# Patient Record
Sex: FEMALE | Race: BLACK OR AFRICAN AMERICAN | Employment: FULL TIME | URBAN - METROPOLITAN AREA
[De-identification: names, ages, dates, MRNs, and addresses within clinical notes are randomized per-mention and may not be internally consistent; named-entity substitution may affect disease eponyms.]

---

## 2017-03-23 ENCOUNTER — OFFICE VISIT (OUTPATIENT)
Dept: FAMILY MEDICINE CLINIC | Age: 32
End: 2017-03-23

## 2017-03-23 VITALS
HEART RATE: 73 BPM | OXYGEN SATURATION: 100 % | RESPIRATION RATE: 20 BRPM | TEMPERATURE: 98.9 F | BODY MASS INDEX: 39.09 KG/M2 | SYSTOLIC BLOOD PRESSURE: 137 MMHG | WEIGHT: 229 LBS | DIASTOLIC BLOOD PRESSURE: 89 MMHG | HEIGHT: 64 IN

## 2017-03-23 DIAGNOSIS — J40 BRONCHITIS: Primary | ICD-10-CM

## 2017-03-23 RX ORDER — PROMETHAZINE HYDROCHLORIDE AND CODEINE PHOSPHATE 6.25; 1 MG/5ML; MG/5ML
5 SOLUTION ORAL
Qty: 180 ML | Refills: 0 | Status: SHIPPED | OUTPATIENT
Start: 2017-03-23 | End: 2017-03-31 | Stop reason: SDUPTHER

## 2017-03-23 RX ORDER — HYDROCODONE BITARTRATE AND ACETAMINOPHEN 5; 325 MG/1; MG/1
TABLET ORAL
Refills: 0 | COMMUNITY
Start: 2017-01-25 | End: 2017-03-23 | Stop reason: CLARIF

## 2017-03-23 RX ORDER — AZITHROMYCIN 250 MG/1
TABLET, FILM COATED ORAL
Qty: 6 TAB | Refills: 0 | Status: SHIPPED | OUTPATIENT
Start: 2017-03-23 | End: 2017-03-28

## 2017-03-23 RX ORDER — CETIRIZINE HCL 10 MG
10 TABLET ORAL
COMMUNITY
Start: 2017-03-23 | End: 2017-05-22

## 2017-03-23 NOTE — LETTER
NOTIFICATION RETURN TO WORK 
 
3/23/2017 5:21 PM 
 
Ms. Haseeb Cook [address] [facility] [phone] To Whom It May Concern: 
 
Haseeb Cook is currently under the care of [facility]. She will return to work on: 2/27/17 If there are questions or concerns please have the patient contact our office. Sincerely, Yessy Stack NP

## 2017-03-23 NOTE — PATIENT INSTRUCTIONS
Bronchitis: Care Instructions  Your Care Instructions    Bronchitis is inflammation of the bronchial tubes, which carry air to the lungs. The tubes swell and produce mucus, or phlegm. The mucus and inflamed bronchial tubes make you cough. You may have trouble breathing. Most cases of bronchitis are caused by viruses like those that cause colds. Antibiotics usually do not help and they may be harmful. Bronchitis usually develops rapidly and lasts about 2 to 3 weeks in otherwise healthy people. Follow-up care is a key part of your treatment and safety. Be sure to make and go to all appointments, and call your doctor if you are having problems. It's also a good idea to know your test results and keep a list of the medicines you take. How can you care for yourself at home? · Take all medicines exactly as prescribed. Call your doctor if you think you are having a problem with your medicine. · Get some extra rest.  · Take an over-the-counter pain medicine, such as acetaminophen (Tylenol), ibuprofen (Advil, Motrin), or naproxen (Aleve) to reduce fever and relieve body aches. Read and follow all instructions on the label. · Do not take two or more pain medicines at the same time unless the doctor told you to. Many pain medicines have acetaminophen, which is Tylenol. Too much acetaminophen (Tylenol) can be harmful. · Take an over-the-counter cough medicine that contains dextromethorphan to help quiet a dry, hacking cough so that you can sleep. Avoid cough medicines that have more than one active ingredient. Read and follow all instructions on the label. · Breathe moist air from a humidifier, hot shower, or sink filled with hot water. The heat and moisture will thin mucus so you can cough it out. · Do not smoke. Smoking can make bronchitis worse. If you need help quitting, talk to your doctor about stop-smoking programs and medicines. These can increase your chances of quitting for good.   When should you call for help? Call 911 anytime you think you may need emergency care. For example, call if:  · You have severe trouble breathing. Call your doctor now or seek immediate medical care if:  · You have new or worse trouble breathing. · You cough up dark brown or bloody mucus (sputum). · You have a new or higher fever. · You have a new rash. Watch closely for changes in your health, and be sure to contact your doctor if:  · You cough more deeply or more often, especially if you notice more mucus or a change in the color of your mucus. · You are not getting better as expected. Where can you learn more? Go to http://yfn-juanjose.info/. Enter H333 in the search box to learn more about \"Bronchitis: Care Instructions. \"  Current as of: May 23, 2016  Content Version: 11.1  © 2420-8311 [employer]. Care instructions adapted under license by [employer] (which disclaims liability or warranty for this information). If you have questions about a medical condition or this instruction, always ask your healthcare professional. [address] any warranty or liability for your use of this information.

## 2017-03-23 NOTE — MR AVS SNAPSHOT
Visit Information Date & Time Provider Department Dept. Phone Encounter #  
 3/23/2017  4:30 PM Shen Beauchamp NP [facility] [phone] [id number] Your Appointments 4/10/2017  3:45 PM  
ROUTINE CARE with Jayme Mims MD  
[facility] OFFICE-[facility] ([address]) Appt Note: follow up [address] [place] [address] [phone] [phone] [address] [phone] Upcoming Health Maintenance Date Due  
 PAP AKA CERVICAL CYTOLOGY 5/6/2017* DTaP/Tdap/Td series (2 - Td) 3/23/2027 *Topic was postponed. The date shown is not the original due date. Allergies as of 3/23/2017  Review Complete On: 12/4/2016 By: Mookie Dickey RN No Known Allergies Current Immunizations  Never Reviewed No immunizations on file. Not reviewed this visit You Were Diagnosed With   
  
 Codes Comments Bronchitis    -  Primary ICD-10-CM: Q62 ICD-9-CM: 152 Vitals BP Pulse Temp Resp Height(growth percentile) Weight(growth percentile) 137/89 73 98.9 °F (37.2 °C) (Oral) 20 5' 4\" (1.626 m) 229 lb (103.9 kg) LMP SpO2 BMI OB Status Smoking Status 03/02/2017 100% 39.31 kg/m2 Having regular periods Never Smoker Vitals History BMI and BSA Data Body Mass Index Body Surface Area  
 39.31 kg/m 2 2.17 m 2 Preferred Pharmacy Pharmacy Name Phone [facility] [address] [phone] Your Updated Medication List  
  
   
This list is accurate as of: 3/23/17  5:22 PM.  Always use your most recent med list.  
  
  
  
  
 albuterol 90 mcg/actuation inhaler Commonly known as:  PROVENTIL HFA, VENTOLIN HFA, PROAIR HFA Take 2 Puffs by inhalation every six (6) hours as needed for Wheezing. azithromycin 250 mg tablet Commonly known as:  [place] Take 2 tablets today, then take 1 tablet daily  
  
 calcium-cholecalciferol (d3) 600-125 mg-unit Tab Take  by mouth.  
  
 cellulose (bulk) Cap Take  by mouth. cetirizine 10 mg tablet Commonly known as:  ZYRTEC Take 1 Tab by mouth nightly.  
  
 garlic 980 mg Cap capsule Take 500 mg by mouth daily. meloxicam 15 mg tablet Commonly known as:  MOBIC Take 1 Tab by mouth daily. With first meal of the day. multivitamin tablet Commonly known as:  ONE A DAY Take 1 Tab by mouth daily. OTHER  
  
 promethazine-codeine 6.25-10 mg/5 mL syrup Commonly known as:  PHENERGAN with CODEINE Take 5 mL by mouth every six (6) hours as needed for Cough. Max Daily Amount: 20 mL. Prescriptions Printed Refills  
 promethazine-codeine (PHENERGAN WITH CODEINE) 6.25-10 mg/5 mL syrup 0 Sig: Take 5 mL by mouth every six (6) hours as needed for Cough. Max Daily Amount: 20 mL. Class: Print Route: Oral  
  
Prescriptions Sent to Pharmacy Refills  
 azithromycin (ZITHROMAX) 250 mg tablet 0 Sig: Take 2 tablets today, then take 1 tablet daily Class: Normal  
 Pharmacy: [facility] [address] [place] [address] Ph #: [phone] Patient Instructions Bronchitis: Care Instructions Your Care Instructions Bronchitis is inflammation of the bronchial tubes, which carry air to the lungs. The tubes swell and produce mucus, or phlegm. The mucus and inflamed bronchial tubes make you cough. You may have trouble breathing. Most cases of bronchitis are caused by viruses like those that cause colds. Antibiotics usually do not help and they may be harmful. Bronchitis usually develops rapidly and lasts about 2 to 3 weeks in otherwise healthy people. Follow-up care is a key part of your treatment and safety.  Be sure to make and go to all appointments, and call your doctor if you are having problems. It's also a good idea to know your test results and keep a list of the medicines you take. How can you care for yourself at home? · Take all medicines exactly as prescribed. Call your doctor if you think you are having a problem with your medicine. · Get some extra rest. 
· Take an over-the-counter pain medicine, such as acetaminophen (Tylenol), ibuprofen (Advil, Motrin), or naproxen (Aleve) to reduce fever and relieve body aches. Read and follow all instructions on the label. · Do not take two or more pain medicines at the same time unless the doctor told you to. Many pain medicines have acetaminophen, which is Tylenol. Too much acetaminophen (Tylenol) can be harmful. · Take an over-the-counter cough medicine that contains dextromethorphan to help quiet a dry, hacking cough so that you can sleep. Avoid cough medicines that have more than one active ingredient. Read and follow all instructions on the label. · Breathe moist air from a humidifier, hot shower, or sink filled with hot water. The heat and moisture will thin mucus so you can cough it out. · Do not smoke. Smoking can make bronchitis worse. If you need help quitting, talk to your doctor about stop-smoking programs and medicines. These can increase your chances of quitting for good. When should you call for help? Call 911 anytime you think you may need emergency care. For example, call if: 
· You have severe trouble breathing. Call your doctor now or seek immediate medical care if: 
· You have new or worse trouble breathing. · You cough up dark brown or bloody mucus (sputum). · You have a new or higher fever. · You have a new rash. Watch closely for changes in your health, and be sure to contact your doctor if: 
· You cough more deeply or more often, especially if you notice more mucus or a change in the color of your mucus. · You are not getting better as expected. Where can you learn more? Go to http://yfn-juanjose.info/. Enter H333 in the search box to learn more about \"Bronchitis: Care Instructions. \" Current as of: May 23, 2016 Content Version: 11.1 © 7800-8610 [employer] Incorporated. Care instructions adapted under license by [employer] (which disclaims liability or warranty for this information). If you have questions about a medical condition or this instruction, always ask your healthcare professional. [address] any warranty or liability for your use of this information. Introducing [facility]! Wendy Villagran introduces PacketFront patient portal. Now you can access parts of your medical record, email your doctor's office, and request medication refills online. 1. In your internet browser, go to [email or web address] 2. Click on the First Time User? Click Here link in the Sign In box. You will see the New Member Sign Up page. 3. Enter your PacketFront Access Code exactly as it appears below. You will not need to use this code after youve completed the sign-up process. If you do not sign up before the expiration date, you must request a new code. · PacketFront Access Code: [email or web address] Expires: 6/21/2017  5:22 PM 
 
4. Enter the last four digits of your Social Security Number (xxxx) and Date of Birth (mm/dd/yyyy) as indicated and click Submit. You will be taken to the next sign-up page. 5. Create a PacketFront ID. This will be your PacketFront login ID and cannot be changed, so think of one that is secure and easy to remember. 6. Create a PacketFront password. You can change your password at any time. 7. Enter your Password Reset Question and Answer. This can be used at a later time if you forget your password. 8. Enter your e-mail address. You will receive e-mail notification when new information is available in [address] 9. Click Sign Up. You can now view and download portions of your medical record. 10. Click the Download Summary menu link to download a portable copy of your medical information. If you have questions, please visit the Frequently Asked Questions section of the [employer] website. Remember, [employer] is NOT to be used for urgent needs. For medical emergencies, dial 911. Now available from your iPhone and Android! Please provide this summary of care documentation to your next provider. Your primary care clinician is listed as Adriana Decker. If you have any questions after today's visit, please call [phone].

## 2017-03-23 NOTE — PROGRESS NOTES
HISTORY OF PRESENT ILLNESS  Blake Ventura is a 33 y.o. female. HPI  Patient comes in today for cough. 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 Hx asthma. Complains of mostly dry cough, worse at night. Will occasionally have thick yellow mucus. No fever, chills. Some wheezing, dyspnea, chest pains. Appetite good. Denies N/V. Some diarrhea. Body aches. Feels like in last week symptoms have worsened. No Known Allergies    Past Medical History:   Diagnosis Date    Anxiety     Asthma        Past Surgical History:   Procedure Laterality Date    HX WISDOM TEETH EXTRACTION         Social History     Social History    Marital status: SINGLE     Spouse name: N/A    Number of children: N/A    Years of education: N/A     Occupational History    Not on file. Social History Main Topics    Smoking status: Never Smoker    Smokeless tobacco: Never Used    Alcohol use 1.2 oz/week     2 Cans of beer per week    Drug use: Yes     Special: Marijuana    Sexual activity: Not Currently     Other Topics Concern    Not on file     Social History Narrative       Family History   Problem Relation Age of Onset    Diabetes Mother     Hypertension Mother     Elevated Lipids Mother     Hypertension Father     Elevated Lipids Father     Diabetes Father        Current Outpatient Prescriptions   Medication Sig    meloxicam (MOBIC) 15 mg tablet Take 1 Tab by mouth daily. With first meal of the day.  HYDROcodone-acetaminophen (NORCO) 7.5-325 mg per tablet Take 0.5-1 Tabs by mouth every six (6) hours as needed for Pain. Max Daily Amount: 4 Tabs.  calcium-cholecalciferol, d3, 600-125 mg-unit tab Take  by mouth.  cellulose, bulk, cap Take  by mouth.  OTHER     garlic 733 mg cap capsule Take 500 mg by mouth daily.  multivitamin (ONE A DAY) tablet Take 1 Tab by mouth daily.     albuterol (PROVENTIL HFA, VENTOLIN HFA, PROAIR HFA) 90 mcg/actuation inhaler Take 2 Puffs by inhalation every six (6) hours as needed for Wheezing. No current facility-administered medications for this visit. Review of Systems   Constitutional: Negative for chills, diaphoresis, fever, malaise/fatigue and weight loss. HENT: Negative for congestion, ear pain, sore throat and tinnitus. Respiratory: Positive for cough, sputum production, shortness of breath and wheezing. Cardiovascular: Positive for chest pain. Negative for palpitations. Gastrointestinal: Positive for diarrhea. Negative for abdominal pain, nausea and vomiting. Genitourinary: Negative for dysuria, frequency and urgency. Musculoskeletal: Negative for myalgias. Skin: Negative. Neurological: Negative for dizziness, tingling, sensory change, speech change, focal weakness and headaches. Endo/Heme/Allergies: Positive for environmental allergies. Psychiatric/Behavioral: The patient has insomnia (related to coughing at night). Vitals:    03/23/17 1644   BP: 137/89   Pulse: 73   Resp: 20   Temp: 98.9 °F (37.2 °C)   TempSrc: Oral   SpO2: 100%   Weight: 229 lb (103.9 kg)   Height: 5' 4\" (1.626 m)     Physical Exam   Constitutional: She is oriented to person, place, and time. Vital signs are normal. She appears well-developed and well-nourished. She is cooperative. HENT:   Right Ear: Hearing, tympanic membrane, external ear and ear canal normal.   Left Ear: Hearing, tympanic membrane, external ear and ear canal normal.   Nose: Mucosal edema present. Right sinus exhibits no maxillary sinus tenderness and no frontal sinus tenderness. Left sinus exhibits no maxillary sinus tenderness and no frontal sinus tenderness. Mouth/Throat: Uvula is midline, oropharynx is clear and moist and mucous membranes are normal. Mucous membranes are not pale and not dry. No oropharyngeal exudate, posterior oropharyngeal edema or posterior oropharyngeal erythema. Neck: No thyroid mass and no thyromegaly present. Cardiovascular: Normal rate, regular rhythm, S1 normal, S2 normal and normal heart sounds. No murmur heard. Pulses:       Radial pulses are 2+ on the right side, and 2+ on the left side. Dorsalis pedis pulses are 2+ on the right side, and 2+ on the left side. Posterior tibial pulses are 2+ on the right side, and 2+ on the left side. Pulmonary/Chest: Effort normal. She has no decreased breath sounds. She has no wheezes. She has rhonchi. She has no rales. Bronchospastic cough   Lymphadenopathy:        Head (right side): No submental, no submandibular, no tonsillar, no preauricular and no posterior auricular adenopathy present. Head (left side): No submental, no submandibular, no tonsillar, no preauricular and no posterior auricular adenopathy present. She has no cervical adenopathy. Right: No supraclavicular adenopathy present. Left: No supraclavicular adenopathy present. Neurological: She is alert and oriented to person, place, and time. Skin: Skin is warm, dry and intact. Psychiatric: She has a normal mood and affect. Her speech is normal and behavior is normal. Thought content normal.   Vitals reviewed. ASSESSMENT and PLAN    ICD-10-CM ICD-9-CM    1. Bronchitis J40 490 azithromycin (ZITHROMAX) 250 mg tablet      promethazine-codeine (PHENERGAN WITH CODEINE) 6.25-10 mg/5 mL syrup      cetirizine (ZYRTEC) 10 mg tablet     Encounter Diagnoses   Name Primary?  Bronchitis Yes     Orders Placed This Encounter    DISCONTD: HYDROcodone-acetaminophen (NORCO) 5-325 mg per tablet    azithromycin (ZITHROMAX) 250 mg tablet    promethazine-codeine (PHENERGAN WITH CODEINE) 6.25-10 mg/5 mL syrup    cetirizine (ZYRTEC) 10 mg tablet     Keara was seen today for cough, headache and wheezing. Diagnoses and all orders for this visit:    Bronchitis - abx, cough medication. Patient to start taking OTC antihistamine, such as claritin or zyrtec.   Continue to use albuterol prn.  If no improvement in 3-4 days patient to call office for another appointment or advice. -     azithromycin (ZITHROMAX) 250 mg tablet; Take 2 tablets today, then take 1 tablet daily  -     promethazine-codeine (PHENERGAN WITH CODEINE) 6.25-10 mg/5 mL syrup; Take 5 mL by mouth every six (6) hours as needed for Cough. Max Daily Amount: 20 mL. Follow-up Disposition: Not on File    I have reviewed the patient's allergies and made any necessary changes. Medical, procedural, social and family histories have been reviewed and updated as medically indicated. I have reconciled and/or revised patient medications in the EMR. I have discussed each diagnosis listed in this note with Lauryn Mabry and/or their family. I have discussed treatment options and the risk/benefit analysis of those options, including safe use of medications and possible medication side effects. Through the use of shared decision making we have agreed to the above plan. The patient has received an after-visit summary and questions were answered concerning future plans. Margarita Colón, COY-JOAQUIN    This note will not be viewable in [employer].

## 2017-03-31 DIAGNOSIS — J40 BRONCHITIS: ICD-10-CM

## 2017-03-31 RX ORDER — PROMETHAZINE HYDROCHLORIDE AND CODEINE PHOSPHATE 6.25; 1 MG/5ML; MG/5ML
5 SOLUTION ORAL
Qty: 180 ML | Refills: 0 | Status: SHIPPED | OUTPATIENT
Start: 2017-03-31 | End: 2017-05-22

## 2017-04-10 ENCOUNTER — OFFICE VISIT (OUTPATIENT)
Dept: FAMILY MEDICINE CLINIC | Age: 32
End: 2017-04-10

## 2017-04-10 VITALS
BODY MASS INDEX: 38.58 KG/M2 | WEIGHT: 226 LBS | TEMPERATURE: 99 F | HEART RATE: 70 BPM | RESPIRATION RATE: 16 BRPM | DIASTOLIC BLOOD PRESSURE: 74 MMHG | HEIGHT: 64 IN | SYSTOLIC BLOOD PRESSURE: 119 MMHG | OXYGEN SATURATION: 100 %

## 2017-04-10 DIAGNOSIS — E66.9 OBESITY, UNSPECIFIED OBESITY SEVERITY, UNSPECIFIED OBESITY TYPE: ICD-10-CM

## 2017-04-10 DIAGNOSIS — D50.9 IRON DEFICIENCY ANEMIA, UNSPECIFIED IRON DEFICIENCY ANEMIA TYPE: Primary | ICD-10-CM

## 2017-04-10 DIAGNOSIS — M17.0 OSTEOARTHRITIS OF BOTH KNEES, UNSPECIFIED OSTEOARTHRITIS TYPE: ICD-10-CM

## 2017-04-10 LAB — HBA1C MFR BLD HPLC: 5.5 %

## 2017-04-10 RX ORDER — AZITHROMYCIN 250 MG/1
TABLET, FILM COATED ORAL
COMMUNITY
Start: 2017-03-23 | End: 2017-05-22 | Stop reason: ALTCHOICE

## 2017-04-10 NOTE — PROGRESS NOTES
HISTORY OF PRESENT ILLNESS  Heidi Pollack is a 33 y.o. female here today for follow up of anemia. She's a little down today, she is disappointed in her weight. 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 She is still doing Herbal Life but not consistently. She has h/o TED Hb stays around 9, she was using Herbal Life and supplements instead of iron pills before but now that she is not as consistent with Herbal Life she will start OTC iron supplement. Anemia   The history is provided by the patient. This is a chronic problem. The current episode started more than 1 week ago. Pertinent negatives include no chest pain, no abdominal pain and no shortness of breath. Review of Systems   Respiratory: Negative for shortness of breath. Cardiovascular: Negative for chest pain. Gastrointestinal: Negative for abdominal pain. Physical Exam   Constitutional: She is oriented to person, place, and time. She appears well-developed and well-nourished. /74 (BP 1 Location: Left arm, BP Patient Position: Sitting)  Pulse 70  Temp 99 °F (37.2 °C) (Oral)   Resp 16  Ht 5' 4\" (1.626 m)  Wt 226 lb (102.5 kg)  LMP 03/29/2017  SpO2 100%  BMI 38.79 kg/m2     HENT:   Head: Normocephalic and atraumatic. Eyes: No scleral icterus. Neck: No thyromegaly present. Cardiovascular: Normal heart sounds. Pulmonary/Chest: Breath sounds normal.   Abdominal: Soft. There is no tenderness. Musculoskeletal: She exhibits no edema. Lymphadenopathy:     She has no cervical adenopathy. Neurological: She is alert and oriented to person, place, and time. Psychiatric:   tearful   Nursing note and vitals reviewed.     Patient Active Problem List   Diagnosis Code    Exercise-induced asthma J45.990    Obesity E66.9    Osteoarthritis of both knees M17.0     Past Medical History:   Diagnosis Date    Anxiety     Asthma      Social History Social History    Marital status: SINGLE     Spouse name: N/A    Number of children: N/A    Years of education: N/A     Social History Main Topics    Smoking status: Never Smoker    Smokeless tobacco: Never Used    Alcohol use 1.2 oz/week     2 Cans of beer per week    Drug use: Yes     Special: Marijuana    Sexual activity: Not Currently     Other Topics Concern    None     Social History Narrative     Family History   Problem Relation Age of Onset    Diabetes Mother     Hypertension Mother     Elevated Lipids Mother     Hypertension Father     Elevated Lipids Father     Diabetes Father      Current Outpatient Prescriptions   Medication Sig    azithromycin (ZITHROMAX) 250 mg tablet     OTHER     promethazine-codeine (PHENERGAN WITH CODEINE) 6.25-10 mg/5 mL syrup Take 5 mL by mouth every six (6) hours as needed for Cough. Max Daily Amount: 20 mL.  meloxicam (MOBIC) 15 mg tablet Take 1 Tab by mouth daily. With first meal of the day.  calcium-cholecalciferol, d3, 600-125 mg-unit tab Take  by mouth.  cellulose, bulk, cap Take  by mouth.  OTHER     garlic 353 mg cap capsule Take 500 mg by mouth daily.  multivitamin (ONE A DAY) tablet Take 1 Tab by mouth daily.  albuterol (PROVENTIL HFA, VENTOLIN HFA, PROAIR HFA) 90 mcg/actuation inhaler Take 2 Puffs by inhalation every six (6) hours as needed for Wheezing.  cetirizine (ZYRTEC) 10 mg tablet Take 1 Tab by mouth nightly. No Known Allergies    ASSESSMENT and PLAN  Labs pending, pt to start OTC iron pill daily, agreeable to nutritional referral. Care plan reviewed and pt understands. After visit summary printed and reviewed with patient. Milka El was seen today for anemia.     Diagnoses and all orders for this visit:    Iron deficiency anemia, unspecified iron deficiency anemia type  -     REFERRAL TO NUTRITION  -     CBC WITH AUTOMATED DIFF  -     METABOLIC PANEL, COMPREHENSIVE    Osteoarthritis of both knees, unspecified osteoarthritis type    Obesity, unspecified obesity severity, unspecified obesity type  -     REFERRAL TO NUTRITION  -     Cancel: HEMOGLOBIN A1C WITH EAG  -     AMB POC HEMOGLOBIN A1C      lab results and schedule of future lab studies reviewed with patient

## 2017-04-11 LAB
ALBUMIN SERPL-MCNC: 3.9 G/DL (ref 3.5–5.5)
ALBUMIN/GLOB SERPL: 1.4 {RATIO} (ref 1.2–2.2)
ALP SERPL-CCNC: 53 IU/L (ref 39–117)
ALT SERPL-CCNC: 14 IU/L (ref 0–32)
AST SERPL-CCNC: 19 IU/L (ref 0–40)
BASOPHILS # BLD AUTO: 0.1 X10E3/UL (ref 0–0.2)
BASOPHILS NFR BLD AUTO: 1 %
BILIRUB SERPL-MCNC: <0.2 MG/DL (ref 0–1.2)
BUN SERPL-MCNC: 10 MG/DL (ref 6–20)
BUN/CREAT SERPL: 13 (ref 9–23)
CALCIUM SERPL-MCNC: 8.8 MG/DL (ref 8.7–10.2)
CHLORIDE SERPL-SCNC: 102 MMOL/L (ref 96–106)
CO2 SERPL-SCNC: 24 MMOL/L (ref 18–29)
CREAT SERPL-MCNC: 0.8 MG/DL (ref 0.57–1)
EOSINOPHIL # BLD AUTO: 0.2 X10E3/UL (ref 0–0.4)
EOSINOPHIL NFR BLD AUTO: 3 %
ERYTHROCYTE [DISTWIDTH] IN BLOOD BY AUTOMATED COUNT: 17.1 % (ref 12.3–15.4)
GLOBULIN SER CALC-MCNC: 2.8 G/DL (ref 1.5–4.5)
GLUCOSE SERPL-MCNC: 104 MG/DL (ref 65–99)
HCT VFR BLD AUTO: 29.6 % (ref 34–46.6)
HGB BLD-MCNC: 9.1 G/DL (ref 11.1–15.9)
IMM GRANULOCYTES # BLD: 0 X10E3/UL (ref 0–0.1)
IMM GRANULOCYTES NFR BLD: 0 %
LYMPHOCYTES # BLD AUTO: 2.9 X10E3/UL (ref 0.7–3.1)
LYMPHOCYTES NFR BLD AUTO: 32 %
MCH RBC QN AUTO: 22.1 PG (ref 26.6–33)
MCHC RBC AUTO-ENTMCNC: 30.7 G/DL (ref 31.5–35.7)
MCV RBC AUTO: 72 FL (ref 79–97)
MONOCYTES # BLD AUTO: 0.6 X10E3/UL (ref 0.1–0.9)
MONOCYTES NFR BLD AUTO: 6 %
NEUTROPHILS # BLD AUTO: 5.4 X10E3/UL (ref 1.4–7)
NEUTROPHILS NFR BLD AUTO: 58 %
PLATELET # BLD AUTO: 300 X10E3/UL (ref 150–379)
POTASSIUM SERPL-SCNC: 4.4 MMOL/L (ref 3.5–5.2)
PROT SERPL-MCNC: 6.7 G/DL (ref 6–8.5)
RBC # BLD AUTO: 4.11 X10E6/UL (ref 3.77–5.28)
SODIUM SERPL-SCNC: 142 MMOL/L (ref 134–144)
WBC # BLD AUTO: 9.2 X10E3/UL (ref 3.4–10.8)

## 2017-04-11 NOTE — PROGRESS NOTES
Call pt, still anemic. Start OTC iron pill daily w stool softener, follow up for recheck 6 weeks, we will recheck labs at that time.

## 2017-04-21 ENCOUNTER — HOSPITAL ENCOUNTER (OUTPATIENT)
Dept: NUTRITION | Age: 32
Discharge: HOME | End: 2017-04-21
Payer: COMMERCIAL

## 2017-04-21 PROCEDURE — 97802 MEDICAL NUTRITION INDIV IN: CPT | Performed by: DIETITIAN, REGISTERED

## 2017-04-21 NOTE — PROGRESS NOTES
[address] [place] [address], [facility] [place] [facility], [facility] Timothy Grover 55   [phone]   Nutrition Assessment - Medical Nutrition Therapy   Outpatient  Initial Evaluation         Patient Name: Debbie Morrison : 1985   Treatment Diagnosis: Obesity, Iron Deficiency Anemia Onset Date:    Referral Source: Clau Barajas MD Start of Care [facility]): 2017     Ht: 64 in  cm Wt:  221.2 lb  kg   BMI: 45  BMR male    BMR female 1010     Diagnosis: See above. Medications of Nutritional Significance:   Herbalife fiber supplements and multivitamin tab     Subjective/Assessment: Pt is a 28yo female who works as an  for [employer]. She is here today for help with weight loss and Iron deficiency anemia. She is not currently taking iron supplement and states she would prefer food sources of iron. Labs show she has had anemia for at least a year and Ferritin has been as low as 6ng/ml (2016). Encouraged pt to take a ferrous form of iron in addition to incorporating the high iron foods discussed today. She has had weight loss success in the past using Herbalife and exercising. However she struggles with stress eating and has a hx of depression and anxiety. She has recently returned to exercise with a goal of 5 days per week at the gym. She is currently using Herbalife shakes to replace 2 meals and has snacks and 1 full meal.  She states in the past when she gained weight she was eating junk food in addition to her Herbalife shakes, but is now working to cut these junk foods out again. Provided positive reinforcement for motivation and goals. Educated pt on building a balanced plate, and set goals to remove junk foods from diet. Identified healthy snacks and worked together to [employer] for eating 2 full meals and only using Herbalife shake for 1 meal replacement each day. Pt expressed some concern over cost of foods so also provided [employer] list to help pick out healthy low cost foods for meal plan options. Pt expressed comprehension, high motivation, and compliance is expected. Current Eating Patterns: 2 Herbalife shakes per day, Herbalife teas and fiber supplements. 1 meal, some snacks in between (fruit, nuts, carrots with ranch, etc)  When stressed at work or sad pt will turn to junk foods including large Cookout shakes with extra cookies, chips, french fries, sodas, etc.  She is currently working to remove these from her life. 1 meal = salad with dressing, crab salad, tuna salad, and pasta salad       Handouts/  Information Provided: []  Carbohydrates  []  Protein  []  Fiber  [x]  Serving Sizes  [x]  Meal and Snack Ideas  []  Food Journals []  Diabetes  []  Cholesterol  []  Sodium  []  Gen Nutr Guidelines  []  SBGM Guidelines  [x]  Others: Iron Food Sources; [employer]cery List   Estimate Needs:   Calories: 1700 Protein: 106 Carbs: 191 Fat: 57   Kcal/day  g/day  g/day  g/day         percent: 25 percent: 45 percent: 30     Nutritional Goal - To promote lifestyle changes to result in:    [x]  weight loss  []  Improved diabetic control  []  Decreased cholesterol levels  []  Decreased blood pressure  []  weight maintenance []  Preventing any interactions associated with food allergies  []  Adequate weight gain toward goal weight  [x]  Other:  Treat Anemia         Recommendations: -Exercise: 5 days per week.  Have at least one off day  -junk food: cut out chips, gummy bears, fries, milk-shakes, regular sodas, etc.   -choose better snacks: nuts (1/4 cup), carrots, pickles, mini-protein bar, 1 piece fruit  -3 meals per day with 1-2 snack options using balanced plate (limit to 1 fist of starch at meals) and include a good source of iron from list at each meal     Information Reviewed with: pt   Potential Barriers to Learning: Emotional eating patterns, anxiety/depression Dietitian Signature: Ham Parisi MS,RD Date: 4/21/2017   Follow-up:  Time: 2:13 PM

## 2017-05-05 ENCOUNTER — APPOINTMENT (OUTPATIENT)
Dept: NUTRITION | Age: 32
End: 2017-05-05
Payer: COMMERCIAL

## 2017-05-05 ENCOUNTER — HOSPITAL ENCOUNTER (OUTPATIENT)
Dept: NUTRITION | Age: 32
Discharge: HOME | End: 2017-05-05
Payer: COMMERCIAL

## 2017-05-05 PROCEDURE — 97803 MED NUTRITION INDIV SUBSEQ: CPT | Performed by: DIETITIAN, REGISTERED

## 2017-05-05 NOTE — PROGRESS NOTES
NUTRITION - DAILY TREATMENT NOTE  Patient Name: Isidra Gonzalez         Date: 2017  : 1985    YES Patient  Verified  Insurance: Payor: Tanya Durant / Plan: Kylie Small HMO / Product Type: HMO /    Diagnosis: In time:   11:00pm             Out time:   11:30pm   Total Treatment Time (min):   30     SUBJECTIVE    Medication Changes/New allergies or changes in medical history, any new surgeries or procedures? NO    If yes, update Summary List   Subjective Functional Status/Changes:  []  No changes reported     Pt has trying to exercise regularly and eat 3 meals with 1-2 snacks. One meal is an Herbalife shake per day. She has not been able to workout as consistently as planned and has had some larger meals when eating out with the team.  She is aware of the places she can do better including meal prepping ahead of time and expressed desire to improve her food choices. She has been able to avoid high calorie/sugar foods that she would have bought in the past more often. Reviewed goals and set up back-up plan for eating out to choose healthier options such as no chips, salad with lean protein at [employer]. Pt expressed comprehension, high motivation, and compliance is expected.        Current Wt: 220.8 Previous Wt: 221.2 Wt Change: -0.4     OBJECTIVE    Patient Education:  [x]  Review current plan with patient   []  Other:    Handouts/  Information Provided: []  Carbohydrates  []  Protein  []  Fiber  []  Serving Sizes  []  Fluids  []  General guidelines []  Diabetes  []  Cholesterol  []  Sodium  []  SBGM  []  Food Journals  []  Others:    Estimated Needs: [phone]    Calories Protein CHO Fat     ASSESSMENT    []  Pt Progressing Well  [x]  Slow Progress []  No Progress    Other:    []  Understand Dietary Changes/Recs []  No Change [x]  Improving []  N/A   []  Weight []  No Change [x]  Improving []  N/A     Glucose Levels []  No Change []  Improving []  N/A   []  Cholesterol Levels []  No Change []  Improving []  N/A     RECOMMENDATIONS    1. Exercise 5 days per week   2. email meal prep plan    3. Plan for eating out: avoid chips, order veggies, lean protein, 1 cup starch limit   4.    5.       PLAN    [x]  Continue on current plan []  Follow-up PRN   []  Discharge due to :    [x]  Next Appt[de-identified] May 15 @ 1:30pm     Dietitian: Wander Le MS, RD    Date: 5/5/2017 Time: 1:14 PM

## 2017-05-15 ENCOUNTER — HOSPITAL ENCOUNTER (OUTPATIENT)
Dept: NUTRITION | Age: 32
Discharge: HOME | End: 2017-05-15
Payer: COMMERCIAL

## 2017-05-15 PROCEDURE — 97803 MED NUTRITION INDIV SUBSEQ: CPT | Performed by: DIETITIAN, REGISTERED

## 2017-05-15 NOTE — PROGRESS NOTES
NUTRITION - DAILY TREATMENT NOTE  Patient Name: Valdene Fleischer         Date: 5/15/2017  : 1985    YES Patient  Verified  Insurance: Payor: Tabitha Ramos / Plan: Christianne Kathleen HMO / Product Type: HMO /    Diagnosis: In time:   1:30pm             Out time:   2:00pm   Total Treatment Time (min):   30     SUBJECTIVE    Medication Changes/New allergies or changes in medical history, any new surgeries or procedures? NO    If yes, update Summary List   Subjective Functional Status/Changes:  []  No changes reported     Pt has not started iron pill yet but will purchase today. She has done well with meal prep for lunch/dinner and using herbalife only 1 time per day. She is exercising 5 days per week with 30min total light cardio (walking/bike). She is proud that she has not had any chips in the past 2 weeks and has been able to avoid temptation for junk foods when stressed. Worked with pt to plan balanced snacks (complex carbs + protein) for days when full meals are far apart. Pt has been tracking intake on phone scott and shows average of 1100-1300kcal per day. Encouraged her to aim for 1300-1500kcal per day with activity for energy and healthy weight loss. despite lack of weight loss today on scale, pt notes her clothes are fitting better. Pt expressed comprehension, high motivation, and compliance is expected.       Current Wt: 223.2 Previous Wt: 220.8 Wt Change: +2.4     OBJECTIVE    Patient Education:  [x]  Review current plan with patient   []  Other:    Handouts/  Information Provided: []  Carbohydrates  []  Protein  []  Fiber  []  Serving Sizes  []  Fluids  []  General guidelines []  Diabetes  []  Cholesterol  []  Sodium  []  SBGM  []  Food Journals  []  Others:    Estimated Needs: [phone]    Calories Protein CHO Fat     ASSESSMENT    []  Pt Progressing Well  [x]  Slow Progress []  No Progress    Other:    []  Understand Dietary Changes/Recs []  No Change [x]  Improving []  N/A   []  Weight [x]  No Change []  Improving []  N/A     Glucose Levels []  No Change []  Improving []  N/A   []  Cholesterol Levels []  No Change []  Improving []  N/A     RECOMMENDATIONS    1. Change breakfast to 1 oatmeal packet + 2 eggs or 4-6 egg whites   2. Pack 1-2 snacks for the day between meals: each should have complex carb + protein (list provided)   3. Keep tracking on phone scott. Aim for 1300-1500kcal per day. 4.    5.       PLAN    [x]  Continue on current plan []  Follow-up PRN   []  Discharge due to :    [x]  Next Appt[de-identified] June 19 @ 11:30am (Antonio)     Dietitian: Desirae Alcaraz MS, RD    Date: 5/15/2017 Time: 4:58 PM

## 2017-05-22 ENCOUNTER — OFFICE VISIT (OUTPATIENT)
Dept: FAMILY MEDICINE CLINIC | Age: 32
End: 2017-05-22

## 2017-05-22 VITALS
DIASTOLIC BLOOD PRESSURE: 73 MMHG | SYSTOLIC BLOOD PRESSURE: 111 MMHG | OXYGEN SATURATION: 97 % | BODY MASS INDEX: 37.42 KG/M2 | RESPIRATION RATE: 16 BRPM | HEIGHT: 64 IN | HEART RATE: 65 BPM | WEIGHT: 219.2 LBS | TEMPERATURE: 99.2 F

## 2017-05-22 DIAGNOSIS — S89.92XA BLUNT TRAUMA OF LEFT LOWER LEG, INITIAL ENCOUNTER: Primary | ICD-10-CM
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
## 2017-05-22 NOTE — MR AVS SNAPSHOT
Visit Information Date & Time Provider Department Dept. Phone Encounter #  
 5/22/2017 12:30 PM Alonzo Gamble NP [facility] [phone] [id number] Follow-up Instructions Return if symptoms worsen or fail to improve. Upcoming Health Maintenance Date Due  
 PAP AKA CERVICAL CYTOLOGY 6/28/2004 INFLUENZA AGE 9 TO ADULT 8/1/2017 DTaP/Tdap/Td series (2 - Td) 3/23/2027 Allergies as of 5/22/2017  Review Complete On: 5/22/2017 By: Alonzo Gamble NP No Known Allergies Current Immunizations  Never Reviewed No immunizations on file. Not reviewed this visit You Were Diagnosed With   
  
 Codes Comments Blunt trauma of left lower leg, initial encounter    -  Primary ICD-10-CM: S89. 92XA ICD-9-CM: 914. 7 Vitals BP Pulse Temp Resp Height(growth percentile) Weight(growth percentile) 111/73 (BP 1 Location: Left arm, BP Patient Position: Sitting) 65 99.2 °F (37.3 °C) (Oral) 16 5' 4\" (1.626 m) 219 lb 3.2 oz (99.4 kg) LMP SpO2 BMI OB Status Smoking Status 04/28/2017 (Approximate) 97% 37.63 kg/m2 Having regular periods Never Smoker BMI and BSA Data Body Mass Index Body Surface Area  
 37.63 kg/m 2 2.12 m 2 Preferred Pharmacy Pharmacy Name Phone [facility] [address] [place] [address] [phone] Your Updated Medication List  
  
   
This list is accurate as of: 5/22/17  1:00 PM.  Always use your most recent med list.  
  
  
  
  
 albuterol 90 mcg/actuation inhaler Commonly known as:  PROVENTIL HFA, VENTOLIN HFA, PROAIR HFA Take 2 Puffs by inhalation every six (6) hours as needed for Wheezing. cellulose (bulk) Cap Take  by mouth.  
  
 garlic 118 mg Cap capsule Take 500 mg by mouth daily. meloxicam 15 mg tablet Commonly known as:  MOBIC Take 1 Tab by mouth daily. With first meal of the day. multivitamin tablet Commonly known as:  ONE A DAY Take 1 Tab by mouth daily. * OTHER  
  
 * OTHER  
  
 * Notice: This list has 2 medication(s) that are the same as other medications prescribed for you. Read the directions carefully, and ask your doctor or other care provider to review them with you. Follow-up Instructions Return if symptoms worsen or fail to improve. To-Do List   
 05/22/2017 Imaging:  XR TIB/FIB LT   
  
 06/19/2017 11:30 AM  
  Appointment with Yolanda Manzo at [address] [facility] ([phone]) Introducing [facility] SERVICES! [facility] introduces [employer] patient portal. Now you can access parts of your medical record, email your doctor's office, and request medication refills online. 1. In your internet browser, go to [email or web address] 2. Click on the First Time User? Click Here link in the Sign In box. You will see the New Member Sign Up page. 3. Enter your Postachio Access Code exactly as it appears below. You will not need to use this code after youve completed the sign-up process. If you do not sign up before the expiration date, you must request a new code. · Postachio Access Code: [email or web address] Expires: 6/21/2017  5:22 PM 
 
4. Enter the last four digits of your Social Security Number (xxxx) and Date of Birth (mm/dd/yyyy) as indicated and click Submit. You will be taken to the next sign-up page. 5. Create a Postachio ID. This will be your Postachio login ID and cannot be changed, so think of one that is secure and easy to remember. 6. Create a [employer]io password. You can change your password at any time. 7. Enter your Password Reset Question and Answer. This can be used at a later time if you forget your password. 8. Enter your e-mail address. You will receive e-mail notification when new information is available in [address] 9. Click Sign Up. You can now view and download portions of your medical record. 10. Click the Download Summary menu link to download a portable copy of your medical information. If you have questions, please visit the Frequently Asked Questions section of the [employer] website. Remember, [facility] is NOT to be used for urgent needs. For medical emergencies, dial 911. Now available from your iPhone and Android! Please provide this summary of care documentation to your next provider. Your primary care clinician is listed as Mariam Perrin. If you have any questions after today's visit, please call [phone].

## 2017-06-19 ENCOUNTER — HOSPITAL ENCOUNTER (OUTPATIENT)
Dept: NUTRITION | Age: 32
Discharge: HOME | End: 2017-06-19
Payer: COMMERCIAL

## 2017-06-19 PROCEDURE — 97803 MED NUTRITION INDIV SUBSEQ: CPT | Performed by: DIETITIAN, REGISTERED

## 2017-06-19 NOTE — PROGRESS NOTES
NUTRITION  DAILY TREATMENT NOTE  Patient Name: Randene Ahumada         Date: 2017  : 1985    YES Patient  Verified  Insurance: Payor: Gill Fitting / Plan: [employer] HMO / Product Type: HMO /    Diagnosis: In time:   11:30am             Out time:   12:00pm   Total Treatment Time (min):   30     SUBJECTIVE    Medication Changes/New allergies or changes in medical history, any new surgeries or procedures? NO    If yes, update Summary List   Subjective Functional Status/Changes:  []  No changes reported     Pt is taking her iron supplement everyday now. 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  She has done well with self-control to avoid high temptation foods such as milk shakes and when he does eat less healthy options she is doing so in controlled portion sizes. Reviewed diet hx. Encouraged her to have protein with each meal such as adding a greek yogurt to her oatmeal for breakfast (1 packet instead of 2), continue with her planned meal-prep for lunch (chicken, vegetables, and 1/2 cup brown rice), and only use her meal replacement Herbalife shake for 1 meal per day (she prefers dinner) or have a salad with protein. She has been working out 4 days per week (30min weights + 1.5 miles cardio walking as able with knees). Work is out for the summer next week. She plans to increased exercise and check out [facility] for water aerobics. Provided positive reinforcement for changes made. She has not been tracking food this month on scott due to being overwhelmed at work but plans to return now to help her stay on track and avoid temptation. Pt expressed comprehension, high motivation, and compliance is expected.         Current Wt: 220.0 Previous Wt: 223.2 Wt Change: -3.2     OBJECTIVE    Patient Education:  [x]  Review current plan with patient   []  Other: Handouts/  Information Provided: []  Carbohydrates  []  Protein  []  Fiber  []  Serving Sizes  []  Fluids  []  General guidelines []  Diabetes  []  Cholesterol  []  Sodium  []  SBGM  []  Food Journals  []  Others:    Estimated Needs: [phone] 83    Calories Protein CHO Fat     ASSESSMENT    []  Pt Progressing Well  [x]  Slow Progress []  No Progress    Other:    []  Understand Dietary Changes/Recs []  No Change [x]  Improving []  N/A   []  Weight []  No Change [x]  Improving []  N/A     Glucose Levels []  No Change []  Improving []  N/A   []  Cholesterol Levels []  No Change []  Improving []  N/A     RECOMMENDATIONS    1. Get back to tracking food on scott   2. Check out [employer] for increased cardio   3. Add greek yogurt to breakfast of 1 oatmeal packet; keep lunch same; shake or salad for dinner. 4. Continue taking iron each day. 5.       PLAN    [x]  Continue on current plan []  Follow-up PRN   []  Discharge due to :    [x]  Next Appt[de-identified] July 21 @ 9:00am [facility])     Dietitian: Nick Ridley MS, RD    Date: 6/19/2017 Time: 12:09 PM

## 2017-07-19 NOTE — PERIOP NOTES
[facility]  Ambulatory Surgery Unit  Pre-operative Instructions    Surgery/Procedure Date  7/25            Tentative Arrival Time 845am      1. On the day of your surgery/procedure, please report to the Ambulatory Surgery Unit Registration Desk and sign in at your designated time. The Ambulatory Surgery Unit is located in [facility] on the [facility] side of the [facility] across from the [address] [place] [address]. Please have all of your health insurance cards and a photo ID. 2. You must have someone with you to drive you home, as you should not drive a car for 24 hours following anesthesia. Please make arrangements for a responsible adult friend or family member to stay with you for at least the first 24 hours after your surgery. 3. Do not have anything to eat or drink (including water, gum, mints, coffee, juice) after midnight 7/24. This may not apply to medications prescribed by your physician. (Please note below the special instructions with medications to take the morning of surgery, if applicable.)    4. We recommend you do not drink any alcoholic beverages for 24 hours before and after your surgery. 5. Stop all Aspirin, non-steroidal anti-inflammatory drugs (i.e. Advil, Aleve), vitamins, and supplements as directed by your surgeon's office. **If you are currently taking Plavix, Coumadin, or other blood-thinning agents, contact your surgeon for instructions. **    6. In an effort to help prevent surgical site infection, we ask that you shower with an anti-bacterial soap (i.e. Dial or Safeguard) for 3 days prior to and on the morning of surgery, using a fresh towel after each shower. (Please begin this process with fresh bed linens.) Do not apply any lotions, powders, or deodorants after the shower on the day of your procedure. If applicable, please do not shave the operative site for 48 hours prior to surgery. 7. Wear comfortable clothes. Wear glasses instead of contacts.  Do not bring any jewelry or money (other than copays or fees as instructed). Do not wear make-up, particularly mascara, the morning of your surgery. Do not wear nail polish, particularly if you are having foot /hand surgery. Wear your hair loose or down, no ponytails, buns, sofiya pins or clips. All body piercings must be removed. 8. You should understand that if you do not follow these instructions your surgery may be cancelled. If your physical condition changes (i.e. fever, cold or flu) please contact your surgeon as soon as possible. 9. It is important that you be on time. If a situation occurs where you may be late, or if you have any questions or problems, please call [phone].    10. Your surgery time may be subject to change. You will receive a phone call the day prior to surgery to confirm your arrival time. Special Instructions: Take all medications and inhalers, as prescribed, on the morning of surgery with a sip of water EXCEPT: none      I understand a pre-operative phone call will be made to verify my surgery time. In the event that I am not available, I give permission for a message to be left on my answering service and/or with another person?       yes    Reviewed by phone with pt, verbalized understanding     ___________________      ___________________      ________________  (Signature of Patient)          (Witness)                   (Date and Time)

## 2017-07-21 ENCOUNTER — APPOINTMENT (OUTPATIENT)
Dept: NUTRITION | Age: 32
End: 2017-07-21
Payer: COMMERCIAL

## 2017-07-24 ENCOUNTER — ANESTHESIA EVENT (OUTPATIENT)
Dept: SURGERY | Age: 32
End: 2017-07-24
Payer: COMMERCIAL

## 2017-07-24 NOTE — H&P
Vital Signs   33Years Old Female  Height:  64.5 inches  Weight: 220.5 pounds  BMI:      37.40  BP:       120/78    Past Pregnancy History   : 0  Para:     0  Aborta:  0  Term: 0, Premature: 0, Living Children: 0, Vaginal Deliveries: 0, C-Sections: 0, Elect. Ab: 0, Spont. Ab: 0, Ectopics: 0    Gynecologic History   Last Menstrual Period: 2017  Does patient have any problems with urine leakage? no  Does patient have any other bladder problems? no  History of abnormal pap: no  Gardasil Injection History: Not Applicable  Pt currently sexually active: no  Pt ever sexually active: yes      Visit Type:  Problem GYN  Primary Provider:  Hannah Gabriel MD    CC:   ultrasound follow up for menorrhagia. Liliana Barreraer History of Present Illness:  33year old presents for hysteroscopy and Myosure polypectomy vs myomectomy and D&C  normal and regular menses every month with heaviness. day three and four are heaviest. menstrualflow lasts 7 days.   hgb=9.6-started iron  She states her periods have always been heavy with clots and she assumed this was normal  Ultrasound- 2 small subserous fibroids- 2.3 and 0.7 cm, 19 mm stripe with 1.7 cm echogenic mass in uterus, 1.6 cm CL cyst on right ovary, normal left ovary, no free fluid  Desires fertility  Allergies    This patient has no known allergies.     Medications Removed from Medication List    DICLOFENAC SODIUM TBEC (DICLOFENAC SODIUM TBEC) non vwc md          Past Medical History:     Reviewed history from 2015 and no changes required:        Rheumatoid Arthritis    Past Surgical History:     Reviewed history from 2015 and no changes required:        G[place]neur Teeth    Family History Summary:      Reviewed history Last on 2017 and no changes required:2017  Mother Carashon Saeed.) - Has Family History of Anaesthetic Complications - Entered On: 2015  Mother Cara Saeed.) - Has Family History of Diabetes - Entered On: 2015  Mother Cara Saeed.) - Has Family History of Hypertension - Entered On: 2015  Father Placido Sam.) - Has Family History of Diabetes - Entered On: 2015  MGM - Has Family History of Other Cancer - stomach - Entered On: 2015  MGF - Has Family History of Diabetes - Entered On: 2015  MGF - Has Family History of Lung Cancer - Entered On: 2015  PGM - Has Family History of Hypertension - Entered On: 2015  PGF - Has Family History of Heart Disease - MI - Entered On: 2015  Mother (Mari Young) - Has Family History of Other Cancer - thyroid cancer - Entered On: 2017      Social History:     Reviewed history from 2015 and no changes required:        Single        Works for [employer], truency officer      Risk Factors:     Smoked Tobacco Use:  Never smoker  Smokeless Tobacco Use:  Former  Alcohol use:  yes  Exercise:  yes     Times per week:  2  Seatbelt use:  100 %  Sun Exposure:  occasionally      Past Pregnancy History      Term Births:  0     Premature Births: 0     Living Children: 0     Prev : 0     Elect. Ab:  0     Spont. Ab:  0     Ectopics:  0      Review of Systems        See HPI    Except as noted in the HPI, the review of systems is negative for General, Breast, , Resp, GI, Endo, MS, Psych and Heme. General Medical Physical Exam:     Gastrointestinal:        Abdomen:  soft and non-tender; no masses    Genitourinary:        Ext. genitalia: normal appearance; no lesions or discharge       Urethra:  no discharge       Bladder:  no cystocele       Vagina:  normal appearing without lesions or discharge       Cervix:  normal appearance; no lesions or discharge       Uterus:  normal size and position; no masses       Adnexa:  no masses or tenderness            Impression & Recommendations:    Problem # 1:  Thickened endometrium (ICD-793.5) (CSG16-V33. 8)  Discussed that based on her ultrasound she may have an intracavitary polyp or fibroid. Ultrasound images reviewed with her.  We discussed how this could cause heavy periods and may affect ability to get pregnancy or increase risk of miscarriage. We discussed options of repeating Ultrasound after her next period vs proceeding with hysteroscopy, D&C and possible polypectomy or myomectomy with Myosure for definitive diagnosis and treatment. Risks of surgery reviewed including bleeding, infection, damage to surrounding organs including uterus, bowel, bladder, ureters, nerves, vessels and anesthetic risks. Possible need for further surgery discussed. Risk of post-op pain reviewed. Post-op care and recovery discussed. She would like to go ahead and set up surgery. Orders:  Detailed Moderate Est level 4 ([id number]-32146)      Problem # 2:  Menorrhagia (ICD-626.2) (PLB79-G02.0)    Orders:  Detailed Moderate Est level 4 ([id number])      Problem # 3:  Anemia (non screening) (ICD-285.9) (BVZ75-P39.9)  continue iron  Orders:  Detailed Moderate Est level 4 ([id number])      Problem # 4:  Cyst corpus luteum ruptured RIGHT (ICD-620.8) (RKO03-P49.8)  benign appearance, asymptomatic    Medications (at conclusion of this visit)    06/22/2017 * MEDICINE FOR ARTHRITIS Prescribed by non 606/706 Josué Chao MD          Date of Surgery Update:  Janina Alberto was seen and examined. History and physical has been reviewed. The patient has been examined.  There have been no significant clinical changes since the completion of the originally dated History and Physical.    Signed By: Bryant Lomeli MD     July 25, 2017 8:05 AM

## 2017-07-25 ENCOUNTER — ANESTHESIA (OUTPATIENT)
Dept: SURGERY | Age: 32
End: 2017-07-25
Payer: COMMERCIAL

## 2017-07-25 ENCOUNTER — HOSPITAL ENCOUNTER (OUTPATIENT)
Age: 32
Setting detail: OUTPATIENT SURGERY
Discharge: HOME | End: 2017-07-25
Attending: OBSTETRICS & GYNECOLOGY | Admitting: OBSTETRICS & GYNECOLOGY
Payer: COMMERCIAL

## 2017-07-25 VITALS
RESPIRATION RATE: 13 BRPM | TEMPERATURE: 98 F | SYSTOLIC BLOOD PRESSURE: 118 MMHG | OXYGEN SATURATION: 100 % | BODY MASS INDEX: 37.15 KG/M2 | HEART RATE: 66 BPM | HEIGHT: 64 IN | WEIGHT: 217.6 LBS | DIASTOLIC BLOOD PRESSURE: 85 MMHG

## 2017-07-25 PROBLEM — R93.89 THICKENED ENDOMETRIUM: Status: ACTIVE | Noted: 2017-07-25

## 2017-07-25 PROBLEM — N92.0 MENORRHAGIA: Status: ACTIVE | Noted: 2017-07-25

## 2017-07-25 LAB — HCG UR QL: NEGATIVE

## 2017-07-25 PROCEDURE — 77030018836 HC SOL IRR NACL ICUM -A: Performed by: OBSTETRICS & GYNECOLOGY

## 2017-07-25 PROCEDURE — 77030020255 HC SOL INJ LR 1000ML BG: Performed by: OBSTETRICS & GYNECOLOGY

## 2017-07-25 PROCEDURE — 76210000046 HC AMBSU PH II REC FIRST 0.5 HR: Performed by: OBSTETRICS & GYNECOLOGY

## 2017-07-25 PROCEDURE — 74011250636 HC RX REV CODE- 250/636

## 2017-07-25 PROCEDURE — 77030033650 HC DEV TISS RMVL MYOSUR HOLO -F: Performed by: OBSTETRICS & GYNECOLOGY

## 2017-07-25 PROCEDURE — 76210000040 HC AMBSU PH I REC FIRST 0.5 HR: Performed by: OBSTETRICS & GYNECOLOGY

## 2017-07-25 PROCEDURE — 76060000061 HC AMB SURG ANES 0.5 TO 1 HR: Performed by: OBSTETRICS & GYNECOLOGY

## 2017-07-25 PROCEDURE — 77030012961 HC IRR KT CYSTO/TUR ICUM -A: Performed by: OBSTETRICS & GYNECOLOGY

## 2017-07-25 PROCEDURE — 88305 TISSUE EXAM BY PATHOLOGIST: CPT | Performed by: OBSTETRICS & GYNECOLOGY

## 2017-07-25 PROCEDURE — 74011250636 HC RX REV CODE- 250/636: Performed by: ANESTHESIOLOGY

## 2017-07-25 PROCEDURE — 77030013079 HC BLNKT BAIR HGGR 3M -A: Performed by: NURSE ANESTHETIST, CERTIFIED REGISTERED

## 2017-07-25 PROCEDURE — 81025 URINE PREGNANCY TEST: CPT

## 2017-07-25 PROCEDURE — 74011000250 HC RX REV CODE- 250: Performed by: OBSTETRICS & GYNECOLOGY

## 2017-07-25 PROCEDURE — 76030000000 HC AMB SURG OR TIME 0.5 TO 1: Performed by: OBSTETRICS & GYNECOLOGY

## 2017-07-25 RX ORDER — KETOROLAC TROMETHAMINE 30 MG/ML
INJECTION, SOLUTION INTRAMUSCULAR; INTRAVENOUS AS NEEDED
Status: DISCONTINUED | OUTPATIENT
Start: 2017-07-25 | End: 2017-07-25 | Stop reason: HOSPADM

## 2017-07-25 RX ORDER — LIDOCAINE HYDROCHLORIDE 10 MG/ML
INJECTION, SOLUTION EPIDURAL; INFILTRATION; INTRACAUDAL; PERINEURAL AS NEEDED
Status: DISCONTINUED | OUTPATIENT
Start: 2017-07-25 | End: 2017-07-25 | Stop reason: HOSPADM

## 2017-07-25 RX ORDER — DIPHENHYDRAMINE HYDROCHLORIDE 50 MG/ML
12.5 INJECTION, SOLUTION INTRAMUSCULAR; INTRAVENOUS
Status: DISCONTINUED | OUTPATIENT
Start: 2017-07-25 | End: 2017-07-25 | Stop reason: HOSPADM

## 2017-07-25 RX ORDER — SILVER NITRATE 38.21; 12.74 MG/1; MG/1
STICK TOPICAL AS NEEDED
Status: DISCONTINUED | OUTPATIENT
Start: 2017-07-25 | End: 2017-07-25 | Stop reason: HOSPADM

## 2017-07-25 RX ORDER — LIDOCAINE HYDROCHLORIDE 10 MG/ML
0.1 INJECTION, SOLUTION EPIDURAL; INFILTRATION; INTRACAUDAL; PERINEURAL AS NEEDED
Status: DISCONTINUED | OUTPATIENT
Start: 2017-07-25 | End: 2017-07-25 | Stop reason: HOSPADM

## 2017-07-25 RX ORDER — MORPHINE SULFATE 10 MG/ML
2 INJECTION, SOLUTION INTRAMUSCULAR; INTRAVENOUS
Status: DISCONTINUED | OUTPATIENT
Start: 2017-07-25 | End: 2017-07-25 | Stop reason: HOSPADM

## 2017-07-25 RX ORDER — SODIUM CHLORIDE, SODIUM LACTATE, POTASSIUM CHLORIDE, CALCIUM CHLORIDE 600; 310; 30; 20 MG/100ML; MG/100ML; MG/100ML; MG/100ML
25 INJECTION, SOLUTION INTRAVENOUS CONTINUOUS
Status: DISCONTINUED | OUTPATIENT
Start: 2017-07-25 | End: 2017-07-25 | Stop reason: HOSPADM

## 2017-07-25 RX ORDER — SODIUM CHLORIDE 0.9 % (FLUSH) 0.9 %
5-10 SYRINGE (ML) INJECTION AS NEEDED
Status: DISCONTINUED | OUTPATIENT
Start: 2017-07-25 | End: 2017-07-25 | Stop reason: HOSPADM

## 2017-07-25 RX ORDER — IBUPROFEN 600 MG/1
600 TABLET ORAL
Qty: 30 TAB | Refills: 0 | Status: SHIPPED | OUTPATIENT
Start: 2017-07-25 | End: 2018-07-20

## 2017-07-25 RX ORDER — MIDAZOLAM HYDROCHLORIDE 1 MG/ML
INJECTION, SOLUTION INTRAMUSCULAR; INTRAVENOUS AS NEEDED
Status: DISCONTINUED | OUTPATIENT
Start: 2017-07-25 | End: 2017-07-25 | Stop reason: HOSPADM

## 2017-07-25 RX ORDER — OXYCODONE AND ACETAMINOPHEN 5; 325 MG/1; MG/1
1-2 TABLET ORAL
Qty: 8 TAB | Refills: 0 | Status: SHIPPED | OUTPATIENT
Start: 2017-07-25 | End: 2018-01-30

## 2017-07-25 RX ORDER — SODIUM CHLORIDE 0.9 % (FLUSH) 0.9 %
5-10 SYRINGE (ML) INJECTION EVERY 8 HOURS
Status: DISCONTINUED | OUTPATIENT
Start: 2017-07-25 | End: 2017-07-25 | Stop reason: HOSPADM

## 2017-07-25 RX ORDER — ONDANSETRON 2 MG/ML
INJECTION INTRAMUSCULAR; INTRAVENOUS AS NEEDED
Status: DISCONTINUED | OUTPATIENT
Start: 2017-07-25 | End: 2017-07-25 | Stop reason: HOSPADM

## 2017-07-25 RX ORDER — FENTANYL CITRATE 50 UG/ML
25 INJECTION, SOLUTION INTRAMUSCULAR; INTRAVENOUS
Status: DISCONTINUED | OUTPATIENT
Start: 2017-07-25 | End: 2017-07-25 | Stop reason: HOSPADM

## 2017-07-25 RX ORDER — PROPOFOL 10 MG/ML
INJECTION, EMULSION INTRAVENOUS
Status: DISCONTINUED | OUTPATIENT
Start: 2017-07-25 | End: 2017-07-25 | Stop reason: HOSPADM

## 2017-07-25 RX ORDER — FENTANYL CITRATE 50 UG/ML
INJECTION, SOLUTION INTRAMUSCULAR; INTRAVENOUS AS NEEDED
Status: DISCONTINUED | OUTPATIENT
Start: 2017-07-25 | End: 2017-07-25 | Stop reason: HOSPADM

## 2017-07-25 RX ORDER — HYDROMORPHONE HYDROCHLORIDE 1 MG/ML
.2-.5 INJECTION, SOLUTION INTRAMUSCULAR; INTRAVENOUS; SUBCUTANEOUS
Status: DISCONTINUED | OUTPATIENT
Start: 2017-07-25 | End: 2017-07-25 | Stop reason: HOSPADM

## 2017-07-25 RX ORDER — DEXAMETHASONE SODIUM PHOSPHATE 100 MG/10ML
INJECTION INTRAMUSCULAR; INTRAVENOUS AS NEEDED
Status: DISCONTINUED | OUTPATIENT
Start: 2017-07-25 | End: 2017-07-25 | Stop reason: HOSPADM

## 2017-07-25 RX ORDER — PROPOFOL 10 MG/ML
INJECTION, EMULSION INTRAVENOUS AS NEEDED
Status: DISCONTINUED | OUTPATIENT
Start: 2017-07-25 | End: 2017-07-25 | Stop reason: HOSPADM

## 2017-07-25 RX ADMIN — KETOROLAC TROMETHAMINE 30 MG: 30 INJECTION, SOLUTION INTRAMUSCULAR; INTRAVENOUS at 08:29

## 2017-07-25 RX ADMIN — FENTANYL CITRATE 50 MCG: 50 INJECTION, SOLUTION INTRAMUSCULAR; INTRAVENOUS at 08:16

## 2017-07-25 RX ADMIN — SODIUM CHLORIDE, SODIUM LACTATE, POTASSIUM CHLORIDE, AND CALCIUM CHLORIDE 25 ML/HR: 600; 310; 30; 20 INJECTION, SOLUTION INTRAVENOUS at 09:18

## 2017-07-25 RX ADMIN — DEXAMETHASONE SODIUM PHOSPHATE 4 MG: 100 INJECTION INTRAMUSCULAR; INTRAVENOUS at 08:18

## 2017-07-25 RX ADMIN — ONDANSETRON 4 MG: 2 INJECTION INTRAMUSCULAR; INTRAVENOUS at 08:18

## 2017-07-25 RX ADMIN — FENTANYL CITRATE 50 MCG: 50 INJECTION, SOLUTION INTRAMUSCULAR; INTRAVENOUS at 08:10

## 2017-07-25 RX ADMIN — PROPOFOL 30 MG: 10 INJECTION, EMULSION INTRAVENOUS at 08:22

## 2017-07-25 RX ADMIN — PROPOFOL 75 MCG/KG/MIN: 10 INJECTION, EMULSION INTRAVENOUS at 08:14

## 2017-07-25 RX ADMIN — MIDAZOLAM HYDROCHLORIDE 2 MG: 1 INJECTION, SOLUTION INTRAMUSCULAR; INTRAVENOUS at 08:07

## 2017-07-25 RX ADMIN — SODIUM CHLORIDE, SODIUM LACTATE, POTASSIUM CHLORIDE, AND CALCIUM CHLORIDE 25 ML/HR: 600; 310; 30; 20 INJECTION, SOLUTION INTRAVENOUS at 07:23

## 2017-07-25 NOTE — PERIOP NOTES
Reviewed discharge instructions w/pts. Sister. She verbalized understanding. Vss. Pt. Denies pain. Pt. States she is ready for discharge. Pt discharged via wheelchair, accompanied by RN. Pt discharged awake and alert, respirations equal and unlabored, skin warm, dry, and intact. Pt and family members' questions and concerns addressed prior to discharge.

## 2017-07-25 NOTE — PERIOP NOTES
Ilsa Moment  1985  [id number]    Situation:  Verbal report given from: Nathan Steen CRNA, Angel Rutherford RN  Procedure: Procedure(s): HYSTEROSCOPY/ DILITATION AND CURETTAGE/  MYOSURE POLYPECTOMY     Background:    Preoperative diagnosis: THICKENED ENDOMETRIUM, MENORRHAGIA    Postoperative diagnosis: THICKENED ENDOMETRIUM, MENORRHAGIA    :  Dr. Dulce Cox    Assistant(s): Circ-1: Liu Gavin RN  Scrub Tech-1: Elena Bettencourt    Specimens:   ID Type Source Tests Collected by Time Destination   1 : Endometrial Curettings and Polyps Preservative   Adal Pollock MD 7/25/2017 9788 Pathology       Assessment:  Intra-procedure medications         Anesthesia gave intra-procedure sedation and medications, see anesthesia flow sheet     Intravenous fluids: LR@ KVO     Vital signs stable       Recommendation:    Permission to share finding with family or friend yes

## 2017-07-25 NOTE — ANESTHESIA PREPROCEDURE EVALUATION
Anesthetic History   No history of anesthetic complications            Review of Systems / Medical History  Patient summary reviewed, nursing notes reviewed and pertinent labs reviewed    Pulmonary            Asthma        Neuro/Psych   Within defined limits           Cardiovascular  Within defined limits                Exercise tolerance: >4 METS     GI/Hepatic/Renal  Within defined limits              Endo/Other        Morbid obesity     Other Findings   Comments:  Alcohol use: Yes; 1.2 oz per week      Drug use: Marijuana              Physical Exam    Airway  Mallampati: II  TM Distance: 4 - 6 cm  Neck ROM: normal range of motion   Mouth opening: Normal     Cardiovascular  Regular rate and rhythm,  S1 and S2 normal,  no murmur, click, rub, or gallop             Dental  No notable dental hx       Pulmonary  Breath sounds clear to auscultation               Abdominal  GI exam deferred       Other Findings            Anesthetic Plan    ASA: 3  Anesthesia type: MAC            Anesthetic plan and risks discussed with: Patient

## 2017-07-25 NOTE — IP AVS SNAPSHOT
[address] [facility] 
[phone] Patient: Tone Tobin MRN: [id number] :1985 You are allergic to the following No active allergies Recent Documentation Height Weight BMI OB Status Smoking Status 1.626 m 98.7 kg 37.35 kg/m2 Having regular periods Never Smoker Emergency Contacts Name Discharge Info Relation Home Work Mobile Debbi Bravo DISCHARGE CAREGIVER [3] Mother [14] [phone] About your hospitalization You were admitted on:  2017 You last received care in the:  [facility] AMB SURGERY UNIT You were discharged on:  2017 Unit phone number:  [phone] Why you were hospitalized Your primary diagnosis was:  Not on File Your diagnoses also included:  Menorrhagia, Thickened Endometrium Providers Seen During Your Hospitalizations Provider Role Specialty Primary office phone Justen Castillo MD Attending Provider Obstetrics & Gynecology [phone] Your Primary Care Physician (PCP) Primary Care Physician Office Phone Office Fax [place] Alida [phone] Follow-up Information Follow up With Details Comments Contact Info Jose Guthrie MD   [address] [place] [id number] [phone] Your Appointments 2017 10:00 AM EDT NUTRITION FOLLOW UP with Nikhil Lala [facility] OP NUTRITION ([address]) [address]  
[phone] Current Discharge Medication List  
  
START taking these medications Dose & Instructions Dispensing Information Comments Morning Noon Evening Bedtime  
 ibuprofen 600 mg tablet Commonly known as:  MOTRIN Your last dose was:     
   
Your next dose is:    
   
   
 Dose:  600 mg  
 Take 1 Tab by mouth every six (6) hours as needed for Pain for up to 360 days. Quantity:  30 Tab Refills:  0  
     
   
   
   
  
 oxyCODONE-acetaminophen 5-325 mg per tablet Commonly known as:  PERCOCET Your last dose was: Your next dose is:    
   
   
 Dose:  1-2 Tab Take 1-2 Tabs by mouth every four (4) hours as needed for Pain. Max Daily Amount: 12 Tabs. Quantity:  8 Tab Refills:  0 CONTINUE these medications which have NOT CHANGED Dose & Instructions Dispensing Information Comments Morning Noon Evening Bedtime  
 albuterol 90 mcg/actuation inhaler Commonly known as:  PROVENTIL HFA, VENTOLIN HFA, PROAIR HFA Your last dose was: Your next dose is:    
   
   
 Dose:  2 Puff Take 2 Puffs by inhalation every six (6) hours as needed for Wheezing. Quantity:  1 Inhaler Refills:  2  
     
   
   
   
  
 cellulose (bulk) Cap Your last dose was: Your next dose is: Take  by mouth. Refills:  0  
     
   
   
   
  
 meloxicam 15 mg tablet Commonly known as:  MOBIC Your last dose was: Your next dose is:    
   
   
 Dose:  15 mg Take 1 Tab by mouth daily. With first meal of the day. Quantity:  20 Tab Refills:  0  
     
   
   
   
  
 multivitamin tablet Commonly known as:  ONE A DAY Your last dose was: Your next dose is:    
   
   
 Dose:  1 Tab Take 1 Tab by mouth daily. Refills:  0  
     
   
   
   
  
 * OTHER Your last dose was: Your next dose is:    
   
   
 Dose:  1 Tab Take 1 Tab by mouth daily. Refills:  0  
     
   
   
   
  
 * OTHER Your last dose was: Your next dose is:    
   
   
  Refills:  0  
     
   
   
   
  
 * Notice: This list has 2 medication(s) that are the same as other medications prescribed for you.  Read the directions carefully, and ask your doctor or other care provider to review them with you. Where to Get Your Medications Information on where to get these meds will be given to you by the nurse or doctor. ! Ask your nurse or doctor about these medications  
  ibuprofen 600 mg tablet  
 oxyCODONE-acetaminophen 5-325 mg per tablet Discharge Instructions After Care Instructions For Your D&C 
 
 
1. You may resume your usual diet once the nausea resolves. Initially, try sips of warm fluids and a bland diet. 2. Avoid heavy lifting and straining. Gradually increase your activity. First, try walking and doing light activity around the house. Resume your normal habits if no significant discomfort or bleeding develops. Most women can return to work within one to four days after this procedure. 3. You may take showers. Avoid using a tub bath, swimming pool or hot tub until after your check-up. 4. Do not place anything in your vagina until after your postoperative visit. Do not  
douche, use tampons, or have intercourse because this may cause bleeding and  
infection. 5. You may initially experience a heavy bloody discharge. This should not be more than your menstrual flow. Over the next several days, the flow should steadily decrease. 6. Typically following the procedure, there is little or no pain. You may feel cramps in your lower abdomen. Tylenol may relieve mild cramping. If pain medication does not improve your symptoms, you should contact your physician. 7. Contact the office if you have excessive bleeding (saturating a pad an hour for two hours or passing large clots). It is also necessary to speak with your physician if you develop chills, a temperature greater than 100.4, difficulty voiding or burning on urination. 8. Your physician may want to see you in the office after your D&C. Please call for an appointment if this has not already been arranged.  Our office phone number is [phone]. If appropriate, the microscopic results from your procedure will be discussed at this follow-up visit.     
 
 
>>>You received Toradol during your surgery. You may not take any form of NSAIDS (non steroidal anti inflammatory drugs) such as Advil, Ibuprofen, Aleve, Motrin until 2:30pm.<<< 
 
DO NOT TAKE TYLENOL/ACETAMINOPHEN WITH PERCOCET, LORTAB, NORCO OR VICODEN. TAKE NARCOTIC PAIN MEDICATIONS WITH FOOD Narcotics tend to be constipating, we suggest taking a stool softener such as Colace or Miralax (follow package instructions). DO NOT DRIVE WHILE TAKING NARCOTIC PAIN MEDICATIONS. DO NOT TAKE SLEEPING MEDICATIONS OR ANTIANXIETY MEDICATIONS WHILE TAKING NARCOTIC PAIN MEDICATIONS,  ESPECIALLY THE NIGHT OF ANESTHESIA. CPAP PATIENTS BE SURE TO WEAR MACHINE WHENEVER NAPPING OR SLEEPING. DISCHARGE SUMMARY from Nurse The following personal items collected during your admission are returned to you:  
Dental Appliance: Dental Appliances: None Vision: Visual Aid: Glasses (given to sister) Hearing Aid:   
Jewelry: Jewelry: Necklace (given to sister) Clothing: Clothing: Footwear, Shirt, Undergarments, Shorts (under stretcher) Other Valuables: Other Valuables: Eyeglasses (given to sister) Valuables sent to safe:   
 
 
PATIENT INSTRUCTIONS: 
 
 
B - Balance E - Eyes F-  Face looks uneven A-  Arms unable to move or move even S-  Speech slurred or non-existent T-  Time-call 911 as soon as signs and symptoms begin-DO NOT go Back to bed or wait to see if you get better-TIME IS BRAIN. If you have not received your influenza and/or pneumococcal vaccine, please follow up with your primary care physician. The discharge information has been reviewed with the patient and caregiver. The patient and caregiver verbalized understanding. [address] THROMBOSIS AND PULMONARY EMBOLUS 
 
SURGICAL PATIENTS Surgical patients are the #1 risk for DVT and PE. WHAT IS DVT? WHAT IS PE? 
DVT is a serious condition where blood clots develop deep in the veins of the legs. PE occurs when a blood clot breaks loose from the wall of a vein and travels to the lungs blocking the pulmonary artery or one of its branches impairing blood flow from the heart, which could result in death. RISK FACTORS 
? Surgery lasting longer than 45 minutes ? History of inflammatory bowel disease 
? Oral contraceptive or hormone replacement therapy ? Immobilization ? Varicose veins / swollen legs ? Smoking  
? CHF / Acute MI / Irregular heart beat ? Family history of thrombosis ? General anesthesia greater than 2 hours ? Obesity ? Infection of less than one month ? Less than 1 month postpartum 
? COPD / Pneumonia ? Arthroscopic surgery ? Malignancy / cancer ? Spine surgery ? Blood abnormalities ? Stroke / Paralysis / Coma SIGNS AND SYMPTOMS OF DEEP VEIN TROMBOSIS Usually occurs in one leg, above or below the knee ? Swelling  one calf or thigh may be larger than the other ? Feeling increased warmth in the area of the leg that is swollen or painful ? Leg pain, which may increase when standing or walking ? Swelling along the vein of the leg ? When swollen areas is pressed with a finger, a depression may remain ? Tenderness of the leg that may be confined to one area ? Change in leg color (bluish or red) SIGNS AND SYMPTOMS OF PULMONARY EMBOLUS 
? Chest pain that gets worse with deep breath, coughing or chest movement ? Coughing up blood ? Sweating ? Shortness of breath or difficulty breathing ? Rapid heart beat ? Lightheadedness HOW TO REDUCE THE POSSIBLE RISK OF DVT ? Exercise  simple activities as rotating ankles and wrists, wiggling toes and fingers, tightening and relaxing muscles in calves and thighs promotes circulation while recovering from surgery. Please do these exercises every hour during waking hours ? Take mediation as prescribed by your physician (Lovenox, Coumadin, Aspirin) ? Resume your normal activities as soon as your doctor advises you to do so. 
? Remember, when traveling, to [place] your legs frequently. PATIENTS WHO BELIEVE THEY MAY BE EXPERIENCING SIGNS AND SYMPTOMS OF DVT OR PE SHOULD SEEK MEDICAL HELP IMMEDIATELY Discharge Instructions Attachments/References MEFS - IBUPROFEN (ADVIL, ADVIL CHILDREN'S, MOTRIN, CHILDREN'S IBUPROFEN) - (BY MOUTH) (ENGLISH) MEFS - NARCOTIC-ANALGESIC/ACETAMINOPHEN (PERCOCET, [place] Jose Miguel, LORCET HD, LORTAB 10/325) - (BY MOUTH) (ENGLISH) Discharge Orders None Introducing [facility] SERVICES! Marina Abbott introduces [employer] patient portal. Now you can access parts of your medical record, email your doctor's office, and request medication refills online. 1. In your internet browser, go to [email or web address] 2. Click on the First Time User? Click Here link in the Sign In box. You will see the New Member Sign Up page. 3. Enter your [employer] Access Code exactly as it appears below. You will not need to use this code after youve completed the sign-up process. If you do not sign up before the expiration date, you must request a new code. · [employer] Access Code: [email or web address] Expires: 9/16/2017  3:47 PM 
 
4. Enter the last four digits of your Social Security Number (xxxx) and Date of Birth (mm/dd/yyyy) as indicated and click Submit. You will be taken to the next sign-up page. 5. Create a [employer] ID. This will be your [employer] login ID and cannot be changed, so think of one that is secure and easy to remember. 6. Create a [employer] password. You can change your password at any time. 7. Enter your Password Reset Question and Answer. This can be used at a later time if you forget your password. 8. Enter your e-mail address. You will receive e-mail notification when new information is available in [address] 9. Click Sign Up. You can now view and download portions of your medical record. 10. Click the Download Summary menu link to download a portable copy of your medical information. If you have questions, please visit the Frequently Asked Questions section of the [employer] website. Remember, [employer] is NOT to be used for urgent needs. For medical emergencies, dial 911. Now available from your iPhone and Android! General Information Please provide this summary of care documentation to your next provider. Patient Signature:  ____________________________________________________________ Date:  ____________________________________________________________  
  
Sissy Wesine Provider Signature:  ____________________________________________________________ Date:  ____________________________________________________________ More Information Ibuprofen (Advil, Advil Children's, Motrin, Children's Ibuprofen) - (By mouth) Why this medicine is used:  
Treats pain and fever. This medicine is an NSAID. Contact a nurse or doctor right away if you have: 
· Change in how much or how often you urinate · Severe stomach pain, vomiting blood, bloody or black tarry stools · Swelling in your hands, ankles, or feet; rapid weight gain Common side effects: 
· Constipation, diarrhea, gas, mild upset stomach · Ringing in your ears, dizziness, headache © 2017 [address] Information is for End User's use only and may not be sold, redistributed or otherwise used for commercial purposes. Narcotic-Analgesic/Acetaminophen (Percocet, Norco, Lorcet HD, Lortab 10/325) - (By mouth) Why this medicine is used:  
Relieves pain. Contact a nurse or doctor right away if you have: 
· Extreme weakness, shallow breathing, slow heartbeat · Severe confusion, lightheadedness, dizziness, fainting · Yellow skin or eyes, dark urine or pale stools · Severe constipation, severe stomach pain, nausea, vomiting, loss of appetite · Sweating or cold, clammy skin Common side effects: · Mild constipation, nausea, vomiting · Sleepiness, tiredness · Itching, rash © 2017 [address] Information is for End User's use only and may not be sold, redistributed or otherwise used for commercial purposes.

## 2017-07-25 NOTE — OP NOTES
HYSTEROSCOPY WITH MYOSURE POLYPECTOMY FULL OP NOTE        DATE OF PROCEDURE:  7/25/2017    PREOPERATIVE DIAGNOSIS:  THICKENED ENDOMETRIUM, MENORRHAGIA    POSTOPERATIVE DIAGNOSIS:  THICKENED ENDOMETRIUM, MENORRHAGIA    PROCEDURE: Hysteroscopy with Myosure polypectomy and endometrial sampling. SURGEON:  Josee Baker MD    ASSISTANT:  none    ANESTHESIA: MAC. Paracervical block. EBL:  minimal    FINDINGS: Two endometrial polyps arising from the posterior aspect of the uterine cavity. Otherwise normal appearing endometrial cavity. Specimen:  Endometrial polyp and curettings    PROCEDURE: Patient was placed on the operating table in the supine position. Time out was done to confirm the operating procedure, surgeon, patient and site. Once confirmed by the team, procedure was started. MAC anesthesia was found to be adequate and she was prepped and draped in normal sterile fashion in dorsal lithotomy position. A sterile speculum was placed in the vagina and the anterior lip of the cervix grasped with a single-tooth tenaculum. A paracervical block was obtained by injecting 5 cc of 1% Lidocaine at 4 and 8 o'clock. The cervix was then easily dilated to 23-[place]. The Myosure hysteroscope was then placed in the endometrial cavity using normal saline as distention media. The above findings were noted. The Myosure device was then place through the outflow port and the two polyps shaved off the endometrial cavity using product guidelines. The endometrium was sampled using the Myosure device. The polyps were removed in their entirety. There was no bleeding seen and the hysteroscopy was removed. There was no bleeding. Instruments were removed. There was a 50 cc difference in I&Os of distention media. Sponge, lap and needle counts were correct x 2. The patient went to the recovery room in satisfactory condition. She will call with any increased pain, fever, or bleeding.

## 2017-07-25 NOTE — DISCHARGE INSTRUCTIONS
After Care Instructions For Your D&C      1. You may resume your usual diet once the nausea resolves. Initially, try sips of warm fluids and a bland diet. 2. Avoid heavy lifting and straining. Gradually increase your activity. First, try walking and doing light activity around the house. Resume your normal habits if no significant discomfort or bleeding develops. Most women can return to work within one to four days after this procedure. 3. You may take showers. Avoid using a tub bath, swimming pool or hot tub until after your check-up. 4. Do not place anything in your vagina until after your postoperative visit. Do not   douche, use tampons, or have intercourse because this may cause bleeding and   infection. 5. You may initially experience a heavy bloody discharge. This should not be more than your menstrual flow. Over the next several days, the flow should steadily decrease. 6. Typically following the procedure, there is little or no pain. You may feel cramps in your lower abdomen. Tylenol may relieve mild cramping. If pain medication does not improve your symptoms, you should contact your physician. 7. Contact the office if you have excessive bleeding (saturating a pad an hour for two hours or passing large clots). It is also necessary to speak with your physician if you develop chills, a temperature greater than 100.4, difficulty voiding or burning on urination. 8. Your physician may want to see you in the office after your D&C. Please call for an appointment if this has not already been arranged. Our office phone number is [phone]. If appropriate, the microscopic results from your procedure will be discussed at this follow-up visit.          >>>You received Toradol during your surgery.  You may not take any form of NSAIDS (non steroidal anti inflammatory drugs) such as Advil, Ibuprofen, Aleve, Motrin until 2:30pm.<<<    DO NOT TAKE TYLENOL/ACETAMINOPHEN WITH PERCOCET, [address] [facility], [address] [address] [facility]. TAKE NARCOTIC PAIN MEDICATIONS WITH FOOD   Narcotics tend to be constipating, we suggest taking a stool softener such as Colace or Miralax (follow package instructions). DO NOT DRIVE WHILE TAKING NARCOTIC PAIN MEDICATIONS. DO NOT TAKE SLEEPING MEDICATIONS OR ANTIANXIETY MEDICATIONS WHILE TAKING NARCOTIC PAIN MEDICATIONS,  ESPECIALLY THE NIGHT OF ANESTHESIA. CPAP PATIENTS BE SURE TO WEAR MACHINE WHENEVER NAPPING OR SLEEPING. DISCHARGE SUMMARY from Nurse    The following personal items collected during your admission are returned to you:   Dental Appliance: Dental Appliances: None  Vision: Visual Aid: Glasses (given to sister)  Hearing Aid:    Jewelry: Jewelry: Arnulfo Luis Manuel (given to sister)  Clothing: Clothing: Footwear, Shirt, Undergarments, Shorts (under stretcher)  Other Valuables: Other Valuables: Eyeglasses (given to sister)  Valuables sent to safe:        PATIENT INSTRUCTIONS:    After General Anesthesia or Intravenous Sedation, for 24 hours or while taking prescription Narcotics:        Someone should be with you for the next 24 hours. For your own safety, a responsible adult must drive you home. · Limit your activities  · Recommended activity: Rest today, up with assistance today. Do not climb stairs or shower unattended for the next 24 hours. · Please start with a soft bland diet and advance as tolerated (no nausea) to regular diet. · If you have a sore throat you should try the following: fluids, warm salt water gargles, or throat lozenges. If it does not improve after several days please follow up with your primary physician. · Do not drive and operate hazardous machinery  · Do not make important personal or business decisions  · Do  not drink alcoholic beverages  · If you have not urinated within 8 hours after discharge, please contact your surgeon on call.     Report the following to your surgeon:  · Excessive pain, swelling, redness or odor of or around the surgical area  · Temperature over 100.5  · Nausea and vomiting lasting longer than 4 hours or if unable to take medications  · Any signs of decreased circulation or nerve impairment to extremity: change in color, persistent  numbness, tingling, coldness or increase pain      · You will receive a Post Operative Call from one of the Recovery Room Nurses on the day after your surgery to check on you. It is very important for us to know how you are recovering after your surgery. If you have an issue or need to speak with someone, please call your surgeon, do not wait for the post operative call. · You may receive an e-mail or letter in the mail from [place] regarding your experience with us in the Ambulatory Surgery Unit. Your feedback is valuable to us and we appreciate your participation in the survey. · If the above instructions are not adequate, please contact Pretty Adams RN, Terri anesthesia Nurse Manager or our Anesthesiologist, at [phone]. If you are having problems after your surgery, call the physician at his office number. · We wish you a speedy recovery ? What to do at Home:      *  Please give a list of your current medications to your Primary Care Provider. *  Please update this list whenever your medications are discontinued, doses are      changed, or new medications (including over-the-counter products) are added. *  Please carry medication information at all times in case of emergency situations. These are general instructions for a healthy lifestyle:    No smoking/ No tobacco products/ Avoid exposure to second hand smoke    Surgeon General's Warning:  Quitting smoking now greatly reduces serious risk to your health.     Obesity, smoking, and sedentary lifestyle greatly increases your risk for illness    A healthy diet, regular physical exercise & weight monitoring are important for maintaining a healthy lifestyle    You may be retaining fluid if you have a history of heart failure or if you experience any of the following symptoms:  Weight gain of 3 pounds or more overnight or 5 pounds in a week, increased swelling in our hands or feet or shortness of breath while lying flat in bed. Please call your doctor as soon as you notice any of these symptoms; do not wait until your next office visit. Recognize signs and symptoms of STROKE:    B - Balance  E - Eyes    F-  Face looks uneven    A-  Arms unable to move or move even    S-  Speech slurred or non-existent    T-  Time-call 911 as soon as signs and symptoms begin-DO NOT go       Back to bed or wait to see if you get better-TIME IS BRAIN. If you have not received your influenza and/or pneumococcal vaccine, please follow up with your primary care physician. The discharge information has been reviewed with the patient and caregiver. The patient and caregiver verbalized understanding. Latrell Jefferson THROMBOSIS AND PULMONARY EMBOLUS    SURGICAL PATIENTS  Surgical patients are the #1 risk for DVT and PE. WHAT IS DVT? WHAT IS PE?  DVT is a serious condition where blood clots develop deep in the veins of the legs. PE occurs when a blood clot breaks loose from the wall of a vein and travels to the lungs blocking the pulmonary artery or one of its branches impairing blood flow from the heart, which could result in death.   RISK FACTORS   Surgery lasting longer than 45 minutes   History of inflammatory bowel disease   Oral contraceptive or hormone replacement therapy   Immobilization   Varicose veins / swollen legs   Smoking    CHF / Acute MI / Irregular heart beat   Family history of thrombosis   General anesthesia greater than 2 hours   Obesity   Infection of less than one month   Less than 1 month postpartum   COPD / Pneumonia   Arthroscopic surgery   Malignancy / cancer   Spine surgery   Blood abnormalities   Stroke / Paralysis / Coma    SIGNS AND SYMPTOMS OF DEEP VEIN TROMBOSIS  Usually occurs in one leg, above or below the knee   Swelling - one calf or thigh may be larger than the other   Feeling increased warmth in the area of the leg that is swollen or painful   Leg pain, which may increase when standing or walking   Swelling along the vein of the leg   When swollen areas is pressed with a finger, a depression may remain   Tenderness of the leg that may be confined to one area   Change in leg color (bluish or red)    SIGNS AND SYMPTOMS OF PULMONARY EMBOLUS   Chest pain that gets worse with deep breath, coughing or chest movement   Coughing up blood   Sweating   Shortness of breath or difficulty breathing   Rapid heart beat   Lightheadedness    HOW TO REDUCE THE POSSIBLE RISK OF DVT   Exercise - simple activities as rotating ankles and wrists, wiggling toes and fingers, tightening and relaxing muscles in calves and thighs promotes circulation while recovering from surgery. Please do these exercises every hour during waking hours   Take mediation as prescribed by your physician (Lovenox, Coumadin, Aspirin)   Resume your normal activities as soon as your doctor advises you to do so.  Remember, when traveling, to [place] your legs frequently.       PATIENTS WHO BELIEVE THEY MAY BE EXPERIENCING SIGNS AND SYMPTOMS OF DVT OR PE SHOULD SEEK MEDICAL HELP IMMEDIATELY

## 2017-07-25 NOTE — ANESTHESIA POSTPROCEDURE EVALUATION
Post-Anesthesia Evaluation and Assessment    Patient: Ilsa Brar MRN: [id number]  SSN: [id number]    YOB: 1985  Age: 27 y.o. Sex: female       Cardiovascular Function/Vital Signs  Visit Vitals    /85 (BP 1 Location: Left arm, BP Patient Position: At rest)    Pulse 66    Temp 36.7 °C (98 °F)    Resp 13    Ht 5' 4\" (1.626 m)    Wt 98.7 kg (217 lb 9.6 oz)    SpO2 100%    BMI 37.35 kg/m2       Patient is status post MAC anesthesia for Procedure(s): HYSTEROSCOPY/ DILITATION AND CURETTAGE/  MYOSURE POLYPECTOMY . Nausea/Vomiting: None    Postoperative hydration reviewed and adequate. Pain:  Pain Scale 1: Numeric (0 - 10) (07/25/17 0916)  Pain Intensity 1: 0 (07/25/17 0916)   Managed    Neurological Status:   Neuro (WDL): Within Defined Limits (07/25/17 0916)  Neuro  Neurologic State: Drowsy; Eyes open spontaneously (07/25/17 0901)   At baseline    Mental Status and Level of Consciousness: Arousable    Pulmonary Status:   O2 Device: Room air (07/25/17 0916)   Adequate oxygenation and airway patent    Complications related to anesthesia: None    Post-anesthesia assessment completed.  No concerns    Signed By: Joyce Vargas MD     July 25, 2017

## 2017-07-28 ENCOUNTER — HOSPITAL ENCOUNTER (OUTPATIENT)
Dept: NUTRITION | Age: 32
Discharge: HOME | End: 2017-07-28
Payer: COMMERCIAL

## 2017-07-28 PROCEDURE — 97803 MED NUTRITION INDIV SUBSEQ: CPT | Performed by: DIETITIAN, REGISTERED

## 2017-07-28 NOTE — PROGRESS NOTES
NUTRITION  DAILY TREATMENT NOTE  Patient Name: Zach Antonio         Date: 2017  : 1985    YES Patient  Verified  Insurance: Payor: Ovidio Clark / Plan: [id number] [place] HMO / Product Type: HMO /    Diagnosis: In time:   10:00am             Out time:   10:30am   Total Treatment Time (min):   30     SUBJECTIVE    Medication Changes/New allergies or changes in medical history, any new surgeries or procedures? NO    If yes, update Summary List   Subjective Functional Status/Changes:  []  No changes reported     Pt has lost her  with [employer] 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 She wants to test out her products and is now using shakes for 2 meals per day instead of our previous goal of only once per day. Her shake is about 110kcal with 15g protein. Lunch has increased in size and yesterday was a [employer] sub with chips (about 1000 kcal). Reviewed with pt the importance of matching energy intake with expenditure to balance out calories during the day. She is currently limited in her physical activity for the next week following a pollup removal.  She is lifting more and walking on the treadmill for 30min (unsure how far this is). She is very dissatisfied by her lack of weight loss today. Encouraged pt to keep food log for the next few weeks to better be able to see what and how much she is eating. Pt expressed comprehension, high motivation, and compliance is expected.     Current Wt: 220.6 Previous Wt: 220.0 Wt Change: +0.6     OBJECTIVE    Patient Education:  [x]  Review current plan with patient   []  Other:    Handouts/  Information Provided: []  Carbohydrates  []  Protein  []  Fiber  []  Serving Sizes  []  Fluids  []  General guidelines []  Diabetes  []  Cholesterol  []  Sodium  []  SBGM  [x]  Food Journals  []  Others:    Estimated Needs: [phone] 67    Calories Protein CHO Fat     ASSESSMENT    []  Pt Progressing Well  [x] Slow Progress []  No Progress    Other:    []  Understand Dietary Changes/Recs []  No Change [x]  Improving []  N/A   []  Weight [x]  No Change []  Improving []  N/A     Glucose Levels []  No Change []  Improving []  N/A   []  Cholesterol Levels []  No Change []  Improving []  N/A     RECOMMENDATIONS    1. Track food daily on scott or paper provided and bring to next session (or email sooner)   2. Return to [facility] swim when cleared for activity. 3. Spread out calories over the day. Can use 2nd herbalife for a snack if eating 2 smaller meals per day in addition to one for a meal.    4.    5.       PLAN    [x]  Continue on current plan []  Follow-up PRN   []  Discharge due to :    [x]  Next Appt[de-identified]      Dietitian: Radha Garcia MS, RD    Date: 7/28/2017 Time: 11:37 AM

## 2017-08-21 ENCOUNTER — APPOINTMENT (OUTPATIENT)
Dept: NUTRITION | Age: 32
End: 2017-08-21
Payer: COMMERCIAL

## 2017-08-21 ENCOUNTER — HOSPITAL ENCOUNTER (OUTPATIENT)
Dept: NUTRITION | Age: 32
Discharge: HOME | End: 2017-08-21
Payer: COMMERCIAL

## 2017-08-21 PROCEDURE — 97803 MED NUTRITION INDIV SUBSEQ: CPT | Performed by: DIETITIAN, REGISTERED

## 2017-08-21 NOTE — PROGRESS NOTES
NUTRITION  DAILY TREATMENT NOTE  Patient Name: Geovanna Bergman         Date: 2017  : 1985    YES Patient  Verified  Insurance: Payor: Shweta Servant / Plan: [place] HMO / Product Type: HMO /    Diagnosis: In time:   4:00pm             Out time:   4:30pm   Total Treatment Time (min):   30     SUBJECTIVE    Medication Changes/New allergies or changes in medical history, any new surgeries or procedures? NO    If yes, update Summary List   Subjective Functional Status/Changes:  []  No changes reported     Pt has been discouraged by lack of weight loss. She is not exercising and is scheduled for surgery soon. She plans to return to using stationary bike and treadmill. Asked pt to check how many miles she is getting instead of how long she is walking/biking for. Reviewed iron foods list to be sure she is choosing at least one each day. Reminded pt to have 2 real food meals per day and limit Herbalife shakes to 1 per day. Pt is focusing heavily on [employer] products and marketing. She has been avoiding fries and milkshakes successfully this month. 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 She is dreading the stress level this job will bring and states that she just needs a break from checking in with another doctor. Motivation is greatly decreased. Provided positive reinforcement for success with no fries, shakes, and no reported stress eating. She will follow up when ready to continue towards wt loss goals.        Current Wt: 221.4 Previous Wt: 220.6 Wt Change: +0.8     OBJECTIVE    Patient Education:  [x]  Review current plan with patient   []  Other:    Handouts/  Information Provided: []  Carbohydrates  []  Protein  []  Fiber  []  Serving Sizes  []  Fluids  []  General guidelines []  Diabetes  []  Cholesterol  []  Sodium  []  SBGM  []  Food Journals  []  Others:    Estimated Needs: 1300 98 130 43    Calories Protein CHO Fat     ASSESSMENT    []  Pt Progressing Well  []  Slow Progress [x]  No Progress    Other:    []  Understand Dietary Changes/Recs [x]  No Change []  Improving []  N/A   []  Weight [x]  No Change []  Improving []  N/A     Glucose Levels []  No Change []  Improving []  N/A   []  Cholesterol Levels []  No Change []  Improving []  N/A     RECOMMENDATIONS    1. Note how many miles you are biking/walking to better calculate exercise calories instead of using time. 2. Choose at least one high iron food from list each day. 3. 2 meals with portion in mind per day. Only 1 herbalife shake. 4. Continue no fries and milk shakes   5.       PLAN    []  Continue on current plan [x]  Follow-up PRN   []  Discharge due to :    []  Next Appt[de-identified]      Dietitian: Minoo Garcia MS, RD    Date: 8/21/2017 Time: 6:59 PM

## 2017-08-31 ENCOUNTER — TELEPHONE (OUTPATIENT)
Dept: FAMILY MEDICINE CLINIC | Age: 32
End: 2017-08-31

## 2017-08-31 NOTE — TELEPHONE ENCOUNTER
Lm for patient.  Last CPE at [facility] office [address] [facility],[address], [address]

## 2017-08-31 NOTE — TELEPHONE ENCOUNTER
----- Message from Delvis Dowd sent at 8/30/2017  4:26 PM EDT -----  Regarding: Dr. Ashley Krause  The patient is requesting that the doctor or nurse prepares a copy of her last physical to be available for  from the office between 11:30am and 1:00pm. The patient stated that she must turn this information into [employer] by the end of business tomorrow to keep her insurance.  (n)[phone]

## 2017-08-31 NOTE — TELEPHONE ENCOUNTER
----- Message from Ethyl Hope sent at 8/31/2017  8:24 AM EDT -----  Regarding: Darren LUNSFORD/Telephone  Pt is requesting total chol, HDL chol, BP, and weight from most recent CPE, due to a deadline with Jennifer Cordero which is required to be met today. Pt best contact [phone].

## 2018-01-30 ENCOUNTER — OFFICE VISIT (OUTPATIENT)
Dept: FAMILY MEDICINE CLINIC | Age: 33
End: 2018-01-30

## 2018-01-30 VITALS
RESPIRATION RATE: 16 BRPM | HEART RATE: 81 BPM | OXYGEN SATURATION: 95 % | SYSTOLIC BLOOD PRESSURE: 112 MMHG | HEIGHT: 64 IN | DIASTOLIC BLOOD PRESSURE: 67 MMHG | WEIGHT: 213.8 LBS | BODY MASS INDEX: 36.5 KG/M2 | TEMPERATURE: 98.7 F

## 2018-01-30 DIAGNOSIS — Z00.00 PHYSICAL EXAM, ANNUAL: Primary | ICD-10-CM

## 2018-01-30 DIAGNOSIS — M25.532 LEFT WRIST PAIN: Primary | ICD-10-CM

## 2018-01-30 DIAGNOSIS — E66.9 CLASS 2 OBESITY WITH BODY MASS INDEX (BMI) OF 36.0 TO 36.9 IN ADULT, UNSPECIFIED OBESITY TYPE, UNSPECIFIED WHETHER SERIOUS COMORBIDITY PRESENT: ICD-10-CM

## 2018-01-30 RX ORDER — MELOXICAM 15 MG/1
TABLET ORAL
Qty: 30 TAB | Refills: 0 | Status: SHIPPED | OUTPATIENT
Start: 2018-01-30

## 2018-01-30 NOTE — PROGRESS NOTES
HISTORY OF PRESENT ILLNESS  Suni Castillo is a 27 y.o. female. HPI  Pt is here today for an acute visit. Has not yet chosen a new PCP. Has lost 22 lbs over the past year; using [employer], working out, and watching her diet. Avoiding fast food. She has been having left wrist pain. She developed sharp pain on the lateral side of her left wrist that just appeared when she woke up. No trauma or known injury. Pain is worse when she is moving her hand in multiple ways. Her mom had a calcium deposit in the same area, which caused similar symptoms. Has not tried anything for the pain, except for an ACE wrap, which she doesn't think she applied correctly. She is left-handed, so this is interfering with her life. Having some pain at rest, but mild, and then gets worse with movement. Pain improves with rest. Pain is worse with movement, including when she is driving. 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 Saw GYN last year, had a pap smear 2017; saw Armand [facility] NP at [facility]. Pap was normal. Had surgery summer 2017 for polyp removal.   Declines flu vaccine today.      Past Medical History:   Diagnosis Date    Anxiety     Asthma      Past Surgical History:   Procedure Laterality Date    HX GYN      polyps removed from uteris    HX WISDOM TEETH EXTRACTION       Family History   Problem Relation Age of Onset    Diabetes Mother     Hypertension Mother     Elevated Lipids Mother     Hypertension Father    Riggs Stai Elevated Lipids Father     Diabetes Father      Social History     Social History    Marital status: SINGLE     Spouse name: N/A    Number of children: N/A    Years of education: N/A     Social History Main Topics    Smoking status: Never Smoker    Smokeless tobacco: Never Used    Alcohol use 1.2 oz/week     2 Cans of beer per week    Drug use: Yes     Special: Marijuana Comment: not weekly    Sexual activity: Not Currently     Other Topics Concern    None     Social History Narrative     Patient Active Problem List   Diagnosis Code    Exercise-induced asthma J45.990    Obesity E66.9    Osteoarthritis of both knees M17.0    Menorrhagia N92.0    Thickened endometrium R93.8     Outpatient Encounter Prescriptions as of 1/30/2018   Medication Sig Dispense Refill    meloxicam (MOBIC) 15 mg tablet Take 1 tablet by mouth once daily as needed for wrist pain. 30 Tab 0    ibuprofen (MOTRIN) 600 mg tablet Take 1 Tab by mouth every six (6) hours as needed for Pain for up to 360 days. 30 Tab 0    OTHER       cellulose, bulk, cap Take  by mouth.  OTHER Take 1 Tab by mouth daily.  multivitamin (ONE A DAY) tablet Take 1 Tab by mouth daily.  albuterol (PROVENTIL HFA, VENTOLIN HFA, PROAIR HFA) 90 mcg/actuation inhaler Take 2 Puffs by inhalation every six (6) hours as needed for Wheezing. 1 Inhaler 2    [DISCONTINUED] oxyCODONE-acetaminophen (PERCOCET) 5-325 mg per tablet Take 1-2 Tabs by mouth every four (4) hours as needed for Pain. Max Daily Amount: 12 Tabs. 8 Tab 0    [DISCONTINUED] meloxicam (MOBIC) 15 mg tablet Take 1 Tab by mouth daily. With first meal of the day. 20 Tab 0     No facility-administered encounter medications on file as of 1/30/2018. Visit Vitals    /67 (BP 1 Location: Right arm, BP Patient Position: Sitting)    Pulse 81    Temp 98.7 °F (37.1 °C) (Oral)    Resp 16    Ht 5' 4\" (1.626 m)    Wt 213 lb 12.8 oz (97 kg)    LMP 01/05/2018 (Approximate)    SpO2 95%    BMI 36.7 kg/m2           Review of Systems   Constitutional: Negative for chills and fever. Musculoskeletal: Positive for joint pain. Negative for falls and myalgias. Skin: Negative for itching and rash. Neurological: Negative for tingling and focal weakness. Physical Exam   Constitutional: She is oriented to person, place, and time.  She appears well-developed and well-nourished. No distress. HENT:   Head: Normocephalic and atraumatic. Cardiovascular: Normal rate, regular rhythm and normal heart sounds. Pulmonary/Chest: Effort normal and breath sounds normal. No respiratory distress. She has no wheezes. Musculoskeletal:        Left wrist: She exhibits crepitus. She exhibits normal range of motion, no tenderness, no bony tenderness, no swelling, no effusion and no deformity. Left wrist strength 5/5; full ROM; no erythema, warmth, swelling. Mild crepitus with ROM. Neurological: She is alert and oriented to person, place, and time. Skin: Skin is warm and dry. She is not diaphoretic. Psychiatric: She has a normal mood and affect. Her behavior is normal. Judgment normal.   Nursing note and vitals reviewed. ASSESSMENT and PLAN    ICD-10-CM ICD-9-CM    1. Left wrist pain M25.532 719.43 XR WRIST LT AP/LAT      meloxicam (MOBIC) 15 mg tablet     1. Left wrist pain  Likely resolving sprain or tendonitis. ACE-wrapped today; discussed neoprene splint. NSAIDs, ice, rest as much as possible. Will send for Xray. Return in 1 month for fasting labs and 1 week later for CPE/wrist follow-up. Will request pap records from OB/GYN. Follow-up Disposition:  Return in about 1 month (around 2/28/2018) for fasting labs and CPE 1 week later.    Luis Miguel Baron NP

## 2018-01-30 NOTE — PROGRESS NOTES
Chief Complaint   Patient presents with    Hand Pain     left -  December 2017     Patient here for left hand pain. No known injury. Onset 12/2017. No ER visits.

## 2018-01-30 NOTE — MR AVS SNAPSHOT
[facility] 
 
 
 [address] [facility] [id number] [phone] Patient: Calin Pierre MRN: [id number] :1985 Visit Information Date & Time Provider Department Dept. Phone Encounter #  
 2018  2:00 PM Opal Bush NP [facility] [phone] [id number] Follow-up Instructions Return in about 1 month (around 2018) for fasting labs and CPE 1 week later. Upcoming Health Maintenance Date Due  
 PAP AKA CERVICAL CYTOLOGY 2004 DTaP/Tdap/Td series (2 - Td) 3/23/2027 Allergies as of 2018  Review Complete On: 2018 By: Opal Bush NP No Known Allergies Current Immunizations  Never Reviewed No immunizations on file. Not reviewed this visit You Were Diagnosed With   
  
 Codes Comments Left wrist pain    -  Primary ICD-10-CM: P93.839 ICD-9-CM: 719.43 Vitals BP Pulse Temp Resp Height(growth percentile) Weight(growth percentile) 112/67 (BP 1 Location: Right arm, BP Patient Position: Sitting) 81 98.7 °F (37.1 °C) (Oral) 16 5' 4\" (1.626 m) 213 lb 12.8 oz (97 kg) LMP SpO2 BMI OB Status Smoking Status 2018 (Approximate) 95% 36.7 kg/m2 Having regular periods Never Smoker BMI and BSA Data Body Mass Index Body Surface Area 36.7 kg/m 2 2.09 m 2 Preferred Pharmacy Pharmacy Name Phone [facility] [address] [phone] Your Updated Medication List  
  
   
This list is accurate as of: 18  2:30 PM.  Always use your most recent med list.  
  
  
  
  
 albuterol 90 mcg/actuation inhaler Commonly known as:  PROVENTIL HFA, VENTOLIN HFA, PROAIR HFA Take 2 Puffs by inhalation every six (6) hours as needed for Wheezing. cellulose (bulk) Cap Take  by mouth. ibuprofen 600 mg tablet Commonly known as:  MOTRIN  
 Take 1 Tab by mouth every six (6) hours as needed for Pain for up to 360 days. meloxicam 15 mg tablet Commonly known as:  MOBIC Take 1 tablet by mouth once daily as needed for wrist pain. multivitamin tablet Commonly known as:  ONE A DAY Take 1 Tab by mouth daily. * OTHER Take 1 Tab by mouth daily. * OTHER  
  
 * Notice: This list has 2 medication(s) that are the same as other medications prescribed for you. Read the directions carefully, and ask your doctor or other care provider to review them with you. Prescriptions Sent to Pharmacy Refills  
 meloxicam (MOBIC) 15 mg tablet 0 Sig: Take 1 tablet by mouth once daily as needed for wrist pain. Class: Normal  
 Pharmacy: [facility] [address] [place] [address] Ph #: [phone] Follow-up Instructions Return in about 1 month (around 2/28/2018) for fasting labs and CPE 1 week later. To-Do List   
 01/30/2018 Imaging:  XR WRIST LT AP/LAT Patient Instructions Joint Pain: Care Instructions Your Care Instructions Many people have small aches and pains from overuse or injury to muscles and joints. Joint injuries often happen during sports or recreation, work tasks, or projects around the home. An overuse injury can happen when you put too much stress on a joint or when you do an activity that stresses the joint over and over, such as using the computer or rowing a boat. You can take action at home to help your muscles and joints get better. You should feel better in 1 to 2 weeks, but it can take 3 months or more to heal completely. Follow-up care is a key part of your treatment and safety. Be sure to make and go to all appointments, and call your doctor if you are having problems. It's also a good idea to know your test results and keep a list of the medicines you take. How can you care for yourself at home? · Do not put weight on the injured joint for at least a day or two. · For the first day or two after an injury, do not take hot showers or baths, and do not use hot packs. The heat could make swelling worse. · Put ice or a cold pack on the sore joint for 10 to 20 minutes at a time. Try to do this every 1 to 2 hours for the next 3 days (when you are awake) or until the swelling goes down. Put a thin cloth between the ice and your skin. · Wrap the injury in an elastic bandage. Do not wrap it too tightly because this can cause more swelling. · Prop up the sore joint on a pillow when you ice it or anytime you sit or lie down during the next 3 days. Try to keep it above the level of your heart. This will help reduce swelling. · Take an over-the-counter pain medicine, such as acetaminophen (Tylenol), ibuprofen (Advil, Motrin), or naproxen (Aleve). Read and follow all instructions on the label. · After 1 or 2 days of rest, begin moving the joint gently. While the joint is still healing, you can begin to exercise using activities that do not strain or hurt the painful joint. When should you call for help? Call your doctor now or seek immediate medical care if: 
? · You have signs of infection, such as: 
¨ Increased pain, swelling, warmth, and redness. ¨ Red streaks leading from the joint. ¨ A fever. ? Watch closely for changes in your health, and be sure to contact your doctor if: 
? · Your movement or symptoms are not getting better after 1 to 2 weeks of home treatment. Where can you learn more? Go to http://yfn-juanjose.info/. Enter P205 in the search box to learn more about \"Joint Pain: Care Instructions. \" Current as of: March 21, 2017 Content Version: 11.4 © 4406-3173 [employer]. Care instructions adapted under license by [employer] (which disclaims liability or warranty for this information).  If you have questions about a medical condition or this instruction, always ask your healthcare professional. [address]  any warranty or liability for your use of this information. Introducing [facility] [place] [facility]! [facility] introduces [employer] patient portal. Now you can access parts of your medical record, email your doctor's office, and request medication refills online. 1. In your internet browser, go to [email or web address] 2. Click on the First Time User? Click Here link in the Sign In box. You will see the New Member Sign Up page. 3. Enter your [employer] Access Code exactly as it appears below. You will not need to use this code after youve completed the sign-up process. If you do not sign up before the expiration date, you must request a new code. · [employer] Access Code: [email or web address] Expires: 4/30/2018  1:59 PM 
 
4. Enter the last four digits of your Social Security Number (xxxx) and Date of Birth (mm/dd/yyyy) as indicated and click Submit. You will be taken to the next sign-up page. 5. Create a [employer] ID. This will be your [employer] login ID and cannot be changed, so think of one that is secure and easy to remember. 6. Create a [employer] password. You can change your password at any time. 7. Enter your Password Reset Question and Answer. This can be used at a later time if you forget your password. 8. Enter your e-mail address. You will receive e-mail notification when new information is available in [address]. 9. Click Sign Up. You can now view and download portions of your medical record. 10. Click the Download Summary menu link to download a portable copy of your medical information. If you have questions, please visit the Frequently Asked Questions section of the [employer] website. Remember, [employer] is NOT to be used for urgent needs. For medical emergencies, dial 911. Now available from your iPhone and Android! Please provide this summary of care documentation to your next provider. If you have any questions after today's visit, please call [phone].

## 2018-01-30 NOTE — PATIENT INSTRUCTIONS
Joint Pain: Care Instructions  Your Care Instructions    Many people have small aches and pains from overuse or injury to muscles and joints. Joint injuries often happen during sports or recreation, work tasks, or projects around the home. An overuse injury can happen when you put too much stress on a joint or when you do an activity that stresses the joint over and over, such as using the computer or rowing a boat. You can take action at home to help your muscles and joints get better. You should feel better in 1 to 2 weeks, but it can take 3 months or more to heal completely. Follow-up care is a key part of your treatment and safety. Be sure to make and go to all appointments, and call your doctor if you are having problems. It's also a good idea to know your test results and keep a list of the medicines you take. How can you care for yourself at home? · Do not put weight on the injured joint for at least a day or two. · For the first day or two after an injury, do not take hot showers or baths, and do not use hot packs. The heat could make swelling worse. · Put ice or a cold pack on the sore joint for 10 to 20 minutes at a time. Try to do this every 1 to 2 hours for the next 3 days (when you are awake) or until the swelling goes down. Put a thin cloth between the ice and your skin. · Wrap the injury in an elastic bandage. Do not wrap it too tightly because this can cause more swelling. · Prop up the sore joint on a pillow when you ice it or anytime you sit or lie down during the next 3 days. Try to keep it above the level of your heart. This will help reduce swelling. · Take an over-the-counter pain medicine, such as acetaminophen (Tylenol), ibuprofen (Advil, Motrin), or naproxen (Aleve). Read and follow all instructions on the label. · After 1 or 2 days of rest, begin moving the joint gently.  While the joint is still healing, you can begin to exercise using activities that do not strain or hurt the painful joint. When should you call for help? Call your doctor now or seek immediate medical care if:  ? · You have signs of infection, such as:  ¨ Increased pain, swelling, warmth, and redness. ¨ Red streaks leading from the joint. ¨ A fever. ? Watch closely for changes in your health, and be sure to contact your doctor if:  ? · Your movement or symptoms are not getting better after 1 to 2 weeks of home treatment. Where can you learn more? Go to http://yfn-juanjose.info/. Enter P205 in the search box to learn more about \"Joint Pain: Care Instructions. \"  Current as of: March 21, 2017  Content Version: 11.4  © 4466-6266 [employer]. Care instructions adapted under license by [employer] (which disclaims liability or warranty for this information). If you have questions about a medical condition or this instruction, always ask your healthcare professional. [address] any warranty or liability for your use of this information.

## 2018-02-06 ENCOUNTER — DOCUMENTATION ONLY (OUTPATIENT)
Dept: FAMILY MEDICINE CLINIC | Age: 33
End: 2018-02-06

## 2018-02-06 NOTE — PROGRESS NOTES
Records received from [facility]. Pap smear, normal 5/18/2015; normal.   Had hysteroscopy with polypectomy July 2017, done for menorrhagia and TED. Will send to scanning.

## 2018-03-01 ENCOUNTER — TELEPHONE (OUTPATIENT)
Dept: FAMILY MEDICINE CLINIC | Age: 33
End: 2018-03-01

## 2018-03-01 NOTE — TELEPHONE ENCOUNTER
Message left on patient voice mail reminding patient of lab only appointment for today. If patient has not eaten and would like to come it is still okay even late. If patient is not coming or if needs to reschedule patient instructed to call office.

## 2018-04-05 ENCOUNTER — DOCUMENTATION ONLY (OUTPATIENT)
Dept: FAMILY MEDICINE CLINIC | Age: 33
End: 2018-04-05

## 2018-04-05 NOTE — PROGRESS NOTES
Left voice mail on home phone informing patient that she can go to any [employer] out patient testing center to have xray of wrist if she is still in need of exam. ps

## 2019-04-10 NOTE — TELEPHONE ENCOUNTER
Checked  - no record of medication being filled
Phone in medication
Pt states she can not find her cough syrup rx that was given to her, she is requesting another one, pt p# [phone]
stretcher

## (undated) DEVICE — DEVICE TISS RETRV 3MMX25.25IN -- MYOSURE

## (undated) DEVICE — D&C/GYN-LF: Brand: MEDLINE INDUSTRIES, INC.

## (undated) DEVICE — LIGHT HANDLE: Brand: DEVON

## (undated) DEVICE — MEDI-VAC NON-CONDUCTIVE SUCTION TUBING: Brand: CARDINAL HEALTH

## (undated) DEVICE — CONTINU-FLO SOLUTION SET, 2 INJECTION SITES, MALE LUER LOCK ADAPTER WITH RETRACTABLE COLLAR, LARGE BORE STOPCOCK WITH ROTATING MALE LUER LOCK EXTENSION SET, 2 INJECTION SITES, MALE LUER LOCK ADAPTER WITH RETRACTABLE COLLAR: Brand: INTERLINK/CONTINU-FLO

## (undated) DEVICE — INFECTION CONTROL KIT SYS

## (undated) DEVICE — SOLUTION IRRIG 3000ML 0.9% SOD CHL FLX CONT 0797208] ICU MEDICAL INC]

## (undated) DEVICE — SPECIMEN SOCK - STANDARD: Brand: MEDI-VAC

## (undated) DEVICE — KENDALL DL ECG CABLE AND LEAD WIRE SYSTEM, 3-LEAD, SINGLE PATIENT USE: Brand: KENDALL

## (undated) DEVICE — TUBING IRRIG L77IN DIA0.241IN L BOR FOR CYSTO W/ NVENT

## (undated) DEVICE — STERILE POLYISOPRENE POWDER-FREE SURGICAL GLOVES: Brand: PROTEXIS

## (undated) DEVICE — SOLUTION LACTATED RINGERS INJECTION USP

## (undated) DEVICE — 3M™ TEGADERM™ TRANSPARENT FILM DRESSING FRAME STYLE, 1624W, 2-3/8 IN X 2-3/4 IN (6 CM X 7 CM), 100/CT 4CT/CASE: Brand: 3M™ TEGADERM™

## (undated) DEVICE — PREP PAD BNS: Brand: CONVERTORS